# Patient Record
Sex: FEMALE | Race: WHITE | Employment: UNEMPLOYED | URBAN - METROPOLITAN AREA
[De-identification: names, ages, dates, MRNs, and addresses within clinical notes are randomized per-mention and may not be internally consistent; named-entity substitution may affect disease eponyms.]

---

## 2017-01-04 ENCOUNTER — APPOINTMENT (OUTPATIENT)
Dept: GENERAL RADIOLOGY | Age: 47
End: 2017-01-04
Attending: PHYSICIAN ASSISTANT
Payer: SELF-PAY

## 2017-01-04 ENCOUNTER — HOSPITAL ENCOUNTER (EMERGENCY)
Age: 47
Discharge: HOME | End: 2017-01-04
Attending: EMERGENCY MEDICINE
Payer: SELF-PAY

## 2017-01-04 VITALS
SYSTOLIC BLOOD PRESSURE: 140 MMHG | TEMPERATURE: 97.8 F | HEART RATE: 74 BPM | RESPIRATION RATE: 18 BRPM | BODY MASS INDEX: 40.59 KG/M2 | HEIGHT: 61 IN | WEIGHT: 215 LBS | DIASTOLIC BLOOD PRESSURE: 93 MMHG | OXYGEN SATURATION: 100 %

## 2017-01-04 DIAGNOSIS — K27.9 PEPTIC ULCER: Primary | ICD-10-CM

## 2017-01-04 LAB
ALBUMIN SERPL BCP-MCNC: 3.6 G/DL (ref 3.4–5)
ALBUMIN/GLOB SERPL: 1.1 {RATIO} (ref 0.8–1.7)
ALP SERPL-CCNC: 136 U/L (ref 45–117)
ALT SERPL-CCNC: 24 U/L (ref 13–56)
ANION GAP BLD CALC-SCNC: 8 MMOL/L (ref 3–18)
APPEARANCE UR: CLEAR
AST SERPL W P-5'-P-CCNC: 14 U/L (ref 15–37)
BACTERIA URNS QL MICRO: NEGATIVE /HPF
BASOPHILS # BLD AUTO: 0 K/UL (ref 0–0.06)
BASOPHILS # BLD: 0 % (ref 0–2)
BILIRUB SERPL-MCNC: 0.9 MG/DL (ref 0.2–1)
BILIRUB UR QL: NEGATIVE
BUN SERPL-MCNC: 13 MG/DL (ref 7–18)
BUN/CREAT SERPL: 20 (ref 12–20)
CALCIUM SERPL-MCNC: 9 MG/DL (ref 8.5–10.1)
CHLORIDE SERPL-SCNC: 105 MMOL/L (ref 100–108)
CK MB CFR SERPL CALC: 1.3 % (ref 0–4)
CK MB SERPL-MCNC: 1 NG/ML (ref 0.5–3.6)
CK SERPL-CCNC: 80 U/L (ref 26–192)
CO2 SERPL-SCNC: 27 MMOL/L (ref 21–32)
COLOR UR: YELLOW
CREAT SERPL-MCNC: 0.66 MG/DL (ref 0.6–1.3)
DIFFERENTIAL METHOD BLD: ABNORMAL
EOSINOPHIL # BLD: 0.1 K/UL (ref 0–0.4)
EOSINOPHIL NFR BLD: 1 % (ref 0–5)
EPITH CASTS URNS QL MICRO: NORMAL /LPF (ref 0–5)
ERYTHROCYTE [DISTWIDTH] IN BLOOD BY AUTOMATED COUNT: 16 % (ref 11.6–14.5)
GLOBULIN SER CALC-MCNC: 3.3 G/DL (ref 2–4)
GLUCOSE SERPL-MCNC: 114 MG/DL (ref 74–99)
GLUCOSE UR STRIP.AUTO-MCNC: NEGATIVE MG/DL
HCT VFR BLD AUTO: 37.6 % (ref 35–45)
HGB BLD-MCNC: 11.8 G/DL (ref 12–16)
HGB UR QL STRIP: ABNORMAL
KETONES UR QL STRIP.AUTO: NEGATIVE MG/DL
LEUKOCYTE ESTERASE UR QL STRIP.AUTO: NEGATIVE
LIPASE SERPL-CCNC: 149 U/L (ref 73–393)
LYMPHOCYTES # BLD AUTO: 24 % (ref 21–52)
LYMPHOCYTES # BLD: 2 K/UL (ref 0.9–3.6)
MCH RBC QN AUTO: 23.2 PG (ref 24–34)
MCHC RBC AUTO-ENTMCNC: 31.4 G/DL (ref 31–37)
MCV RBC AUTO: 73.9 FL (ref 74–97)
MONOCYTES # BLD: 0.5 K/UL (ref 0.05–1.2)
MONOCYTES NFR BLD AUTO: 6 % (ref 3–10)
NEUTS SEG # BLD: 5.9 K/UL (ref 1.8–8)
NEUTS SEG NFR BLD AUTO: 69 % (ref 40–73)
NITRITE UR QL STRIP.AUTO: NEGATIVE
PH UR STRIP: 5.5 [PH] (ref 5–8)
PLATELET # BLD AUTO: 284 K/UL (ref 135–420)
PMV BLD AUTO: 10.3 FL (ref 9.2–11.8)
POTASSIUM SERPL-SCNC: 4.2 MMOL/L (ref 3.5–5.5)
PROT SERPL-MCNC: 6.9 G/DL (ref 6.4–8.2)
PROT UR STRIP-MCNC: NEGATIVE MG/DL
RBC # BLD AUTO: 5.09 M/UL (ref 4.2–5.3)
RBC #/AREA URNS HPF: NORMAL /HPF (ref 0–5)
SODIUM SERPL-SCNC: 140 MMOL/L (ref 136–145)
SP GR UR REFRACTOMETRY: 1.02 (ref 1–1.03)
TROPONIN I SERPL-MCNC: <0.02 NG/ML (ref 0–0.04)
UROBILINOGEN UR QL STRIP.AUTO: 0.2 EU/DL (ref 0.2–1)
WBC # BLD AUTO: 8.6 K/UL (ref 4.6–13.2)
WBC URNS QL MICRO: NORMAL /HPF (ref 0–4)

## 2017-01-04 PROCEDURE — 83690 ASSAY OF LIPASE: CPT | Performed by: PHYSICIAN ASSISTANT

## 2017-01-04 PROCEDURE — 71020 XR CHEST PA LAT: CPT

## 2017-01-04 PROCEDURE — 82550 ASSAY OF CK (CPK): CPT | Performed by: PHYSICIAN ASSISTANT

## 2017-01-04 PROCEDURE — 80053 COMPREHEN METABOLIC PANEL: CPT | Performed by: EMERGENCY MEDICINE

## 2017-01-04 PROCEDURE — 93005 ELECTROCARDIOGRAM TRACING: CPT

## 2017-01-04 PROCEDURE — 85025 COMPLETE CBC W/AUTO DIFF WBC: CPT | Performed by: EMERGENCY MEDICINE

## 2017-01-04 PROCEDURE — 81001 URINALYSIS AUTO W/SCOPE: CPT | Performed by: EMERGENCY MEDICINE

## 2017-01-04 PROCEDURE — 99283 EMERGENCY DEPT VISIT LOW MDM: CPT

## 2017-01-04 RX ORDER — RANITIDINE 300 MG/1
300 TABLET ORAL DAILY
Qty: 30 TAB | Refills: 0 | Status: SHIPPED | OUTPATIENT
Start: 2017-01-04 | End: 2017-02-03

## 2017-01-04 RX ORDER — OMEPRAZOLE 40 MG/1
40 CAPSULE, DELAYED RELEASE ORAL DAILY
Qty: 30 CAP | Refills: 0 | Status: SHIPPED | OUTPATIENT
Start: 2017-01-04 | End: 2017-02-03

## 2017-01-04 RX ORDER — HYDROCODONE BITARTRATE AND ACETAMINOPHEN 5; 325 MG/1; MG/1
TABLET ORAL
Qty: 12 TAB | Refills: 0 | Status: SHIPPED | OUTPATIENT
Start: 2017-01-04 | End: 2017-02-28

## 2017-01-04 NOTE — DISCHARGE INSTRUCTIONS
Peptic Ulcer Disease: Care Instructions  Your Care Instructions    Peptic ulcers are sores on the inside of the stomach or the small intestine. They are usually caused by an infection with bacteria or from use of nonsteroidal anti-inflammatory drugs (NSAIDs). NSAIDs include aspirin, ibuprofen (Advil), and naproxen (Aleve). Your doctor may have prescribed medicine to reduce stomach acid. You also may need to take antibiotics if your peptic ulcers are caused by an infection. You can help your stomach heal and keep ulcers from coming back by making some changes in your lifestyle. Quit smoking, limit caffeine and alcohol, and reduce stress. Follow-up care is a key part of your treatment and safety. Be sure to make and go to all appointments, and call your doctor if you are having problems. Its also a good idea to know your test results and keep a list of the medicines you take. How can you care for yourself at home? · Take your medicines exactly as prescribed. Call your doctor if you think you are having a problem with your medicine. · Do not take aspirin or other NSAIDs such as ibuprofen (Advil or Motrin) or naproxen (Aleve). Ask your doctor what you can take for pain. · Do not smoke. Smoking can make ulcers worse. If you need help quitting, talk to your doctor about stop-smoking programs and medicines. These can increase your chances of quitting for good. · Drink in moderation or avoid drinking alcohol. · Do not drink beverages that have caffeine if they bother your stomach. These include coffee, tea, and soda. · Eat a balanced diet of small, frequent meals. Make an appointment with a dietitian if you need help planning your meals. · Reduce stress. Avoid people and places that make you feel anxious, if you can. Learn ways to reduce stress, such as biofeedback, guided imagery, and meditation. When should you call for help? Call 911 anytime you think you may need emergency care.  For example, call if:  · You passed out (lost consciousness). · You vomit blood or what looks like coffee grounds. · You pass maroon or very bloody stools. Call your doctor now or seek immediate medical care if:  · You have severe pain in your belly, back, or shoulders. · You have new or worsening belly pain. · You are dizzy or lightheaded, or you feel like you may faint. · Your stools are black and tarlike or have streaks of blood. Watch closely for changes in your health, and be sure to contact your doctor if:  · You have new symptoms such as weight loss, nausea or vomiting. · You do not feel better as expected. Where can you learn more? Go to http://curtis-sherrie.info/. Enter B693 in the search box to learn more about \"Peptic Ulcer Disease: Care Instructions. \"  Current as of: August 9, 2016  Content Version: 11.1  © 4593-5001 "[employer]. Care instructions adapted under license by [employer] (which disclaims liability or warranty for this information). If you have questions about a medical condition or this instruction, always ask your healthcare professional. [place] [address] any warranty or liability for your use of this information.

## 2017-01-04 NOTE — ED PROVIDER NOTES
HPI Comments: Pt is a 41yo female with hx of GERD, anemia, dyspepsia, chronic abdominal pain presenting to ED with abdominal pain x1 day. Pt states pain worsened today while at work. Pt states she became very diaphoretic and boss sent her to ED for eval. Pain is located in upper abdomen and pt has had nausea with 1 episode of vomiting today. Pt states she has GERD and has been diagnosed with an \"ulcer\" in abdomen therefore has chronic abdominal discomfort. Pt states she has had colonoscopy where she was told she had a small ulcer but not clear as to where. Pt states she was started on zantac and pepcid but has not taken either since July. Pt states she has occasional SOB with pain, denies CP, fever, chills, back pain, diarrhea, blood in stool, blood from rectum, dizziness, headaches, urinary symptoms, vaginal discharge/bleeding. Hx of tubal and gall bladder removal. Denies smoking, etoh, drug use    States pain at present is 6/10 but does not want any medication for pain. Patient is a 42 y.o. female presenting with abdominal pain and vomiting. The history is provided by the patient. Abdominal Pain    Associated symptoms include vomiting. Vomiting    Associated symptoms include abdominal pain.         Past Medical History:   Diagnosis Date    Anemia NEC     Arthritis     Dyspepsia and other specified disorders of function of stomach     Endocrine disease      anemic    Kidney stone        Past Surgical History:   Procedure Laterality Date    Hx other surgical       tubal ligation    Pr abdomen surgery proc unlisted  12/24/2012     cholecystectomy    Pr ercp w/sphincterotomy/papillotomy  12/26/2012          Pr ercp remove calculi/debris biliary/pancreas duct  12/26/2012          Hx cholecystectomy  2012    Hx gyn  1992     ceserean    Hx gyn  2001     tubal         Family History:   Problem Relation Age of Onset    Hypertension Mother     Stroke Father     Hypertension Father     Heart Disease Father     Cancer Father      leukemia    Heart Attack       grandfather - 76s    Heart Disease Daughter        Social History     Social History    Marital status: SINGLE     Spouse name: N/A    Number of children: N/A    Years of education: N/A     Occupational History    Not on file. Social History Main Topics    Smoking status: Never Smoker    Smokeless tobacco: Not on file    Alcohol use No    Drug use: No    Sexual activity: Not on file     Other Topics Concern    Not on file     Social History Narrative    ** Merged History Encounter **              ALLERGIES: Codeine and Codeine    Review of Systems   Gastrointestinal: Positive for abdominal pain and vomiting. Vitals:    01/04/17 1343   BP: 142/85   Pulse: 71   Resp: 16   Temp: 98.3 °F (36.8 °C)   SpO2: 100%   Weight: 97.5 kg (215 lb)   Height: 5' 1\" (1.549 m)            Physical Exam   Constitutional: She is oriented to person, place, and time. She appears well-developed and well-nourished. No distress. Appears very comfortable. Eating snack. HENT:   Head: Normocephalic and atraumatic. Eyes: Conjunctivae and EOM are normal.   Neck: Normal range of motion. Neck supple. Cardiovascular: Normal rate, regular rhythm and normal heart sounds. Pulmonary/Chest: Effort normal and breath sounds normal. No respiratory distress. Abdominal: Soft. She exhibits no distension and no mass. There is no rebound and no guarding. Generalized tenderness, no localization    Musculoskeletal: Normal range of motion. Neurological: She is oriented to person, place, and time. No cranial nerve deficit. Coordination normal.   Skin: Skin is warm and dry. Nursing note and vitals reviewed. MDM  Number of Diagnoses or Management Options  Diagnosis management comments: 39yo female with hx of peptic ulcer, kidney stones presenting with abdominal pain and nauea/vomiting x1 day.   Pt appears comfortable at present, afebrile, nontoxic and normal vitals. Will obtain cardiac panel, ekg, cmp, cbc, ua, cxr. Pt does not meet criteria for CT of abdomen at this time. Labs and ua unremarkable,  ua with blood however patient is currently on menstrual cycle. Donnell Beebe PA-C    5:10 PM  Patient is very comfortable in bed, abdomen remains soft. No vomiting, diarrhea while in ED. Pt is afebrile and vitals normal.  Discussed lab and ua results. Pt is ready for d/c home    Impression: peptic ulcer   Pt will be given Rx for zantac and prilosec to start again since she has not taken since July. Pt is instructed to f/u with PCP within 3-5 days and return to ED if she experieinces fever, chills, vomiting, blood in stool, etc.  Donnell Beebe PA-C 5:12 PM    Discussed proper way to take medications. Discussed treatment plan, return precautions, symptomatic relief, and expected time to improvement. All questions answered. Patient is stable for discharge and outpatient management. Donnell Beebe PA-C           Amount and/or Complexity of Data Reviewed  Clinical lab tests: ordered and reviewed  Tests in the radiology section of CPT®: ordered and reviewed    Risk of Complications, Morbidity, and/or Mortality  Presenting problems: moderate  Diagnostic procedures: moderate  Management options: moderate    Patient Progress  Patient progress: improved    ED Course       Procedures    Diagnosis:   1. Peptic ulcer          Disposition: discharge home    Follow-up Information     Follow up With Details [facility] Schedule an appointment as soon as possible for a visit in 1 week  [address] EMERGENCY DEPT  If symptoms worsen, As needed [address]  [phone]          Patient's Medications   Start Taking    No medications on file   Continue Taking    BIOTIN 2,500 MCG CAP    Take  by mouth.     CYANOCOBALAMIN (VITAMIN B-12) 1,000 MCG TABLET    Take 1,000 mcg by mouth daily. ETHINYL ESTRADIOL-ETONOGESTREL (NUVARING) 0.12-0.015 MG/24 HR VAGINAL RING    by Intravaginally route. METHOCARBAMOL (ROBAXIN) 500 MG TABLET    Take 1 Tab by mouth three (3) times daily. PRN spasm, pain    TAMSULOSIN (FLOMAX) 0.4 MG CAPSULE    Take 1 Cap by mouth daily. TAMSULOSIN (FLOMAX) 0.4 MG CAPSULE    Take 1 Cap by mouth daily (after dinner). TAMSULOSIN (FLOMAX) 0.4 MG CAPSULE    TAKE ONE CAPSULE BY MOUTH DAILY AFTER DINNER   These Medications have changed    Modified Medication Previous Medication    HYDROCODONE-ACETAMINOPHEN (NORCO) 5-325 MG PER TABLET HYDROcodone-acetaminophen (NORCO) 5-325 mg per tablet       Take 1-2 tabs PO every 4-6 hours prn pain    Take 1-2 tabs PO every 4-6 hours prn pain    OMEPRAZOLE (PRILOSEC) 40 MG CAPSULE omeprazole (PRILOSEC) 40 mg capsule       Take 1 Cap by mouth daily for 30 days. Take 40 mg by mouth daily. RANITIDINE (ZANTAC) 300 MG TABLET ranitidine (ZANTAC) 300 mg tablet       Take 1 Tab by mouth daily for 30 days. Take 300 mg by mouth daily.    Stop Taking    No medications on file

## 2017-01-04 NOTE — ED NOTES
I have reviewed discharge instructions with the patient. The patient verbalized understanding. Patient armband removed and shredded. VSS.  Patient verbalized understanding of how to properly take prescribed medications

## 2017-01-04 NOTE — Clinical Note
Follow up with [facility] within [facility] days, return to ED with symptoms discussed or with new/concerning symptoms. Take prilosec and zantac as directed.

## 2017-01-05 LAB
ATRIAL RATE: 75 BPM
CALCULATED P AXIS, ECG09: 30 DEGREES
CALCULATED R AXIS, ECG10: 9 DEGREES
CALCULATED T AXIS, ECG11: 23 DEGREES
DIAGNOSIS, 93000: NORMAL
P-R INTERVAL, ECG05: 180 MS
Q-T INTERVAL, ECG07: 382 MS
QRS DURATION, ECG06: 74 MS
QTC CALCULATION (BEZET), ECG08: 426 MS
VENTRICULAR RATE, ECG03: 75 BPM

## 2017-02-20 ENCOUNTER — APPOINTMENT (OUTPATIENT)
Dept: GENERAL RADIOLOGY | Age: 47
End: 2017-02-20
Attending: EMERGENCY MEDICINE
Payer: SELF-PAY

## 2017-02-20 ENCOUNTER — HOSPITAL ENCOUNTER (EMERGENCY)
Age: 47
Discharge: HOME | End: 2017-02-20
Attending: EMERGENCY MEDICINE | Admitting: EMERGENCY MEDICINE
Payer: SELF-PAY

## 2017-02-20 VITALS
DIASTOLIC BLOOD PRESSURE: 98 MMHG | OXYGEN SATURATION: 100 % | BODY MASS INDEX: 44.97 KG/M2 | WEIGHT: 238 LBS | SYSTOLIC BLOOD PRESSURE: 149 MMHG | RESPIRATION RATE: 18 BRPM | HEART RATE: 87 BPM | TEMPERATURE: 98.6 F

## 2017-02-20 DIAGNOSIS — R05.9 COUGH: Primary | ICD-10-CM

## 2017-02-20 DIAGNOSIS — J06.9 ACUTE URI: ICD-10-CM

## 2017-02-20 DIAGNOSIS — I15.9 SECONDARY HYPERTENSION: ICD-10-CM

## 2017-02-20 PROCEDURE — 71020 XR CHEST PA LAT: CPT

## 2017-02-20 PROCEDURE — 99282 EMERGENCY DEPT VISIT SF MDM: CPT

## 2017-02-20 RX ORDER — OMEPRAZOLE 40 MG/1
40 CAPSULE, DELAYED RELEASE ORAL DAILY
COMMUNITY

## 2017-02-20 RX ORDER — BENZONATATE 100 MG/1
100 CAPSULE ORAL
Qty: 30 CAP | Refills: 0 | Status: SHIPPED | OUTPATIENT
Start: 2017-02-20 | End: 2017-02-27

## 2017-02-20 RX ORDER — RANITIDINE HCL 75 MG
75 TABLET ORAL 2 TIMES DAILY
COMMUNITY
End: 2020-01-02

## 2017-02-20 RX ORDER — AZITHROMYCIN 250 MG/1
TABLET, FILM COATED ORAL
Qty: 6 TAB | Refills: 0 | Status: SHIPPED | OUTPATIENT
Start: 2017-02-20 | End: 2017-02-25

## 2017-02-21 NOTE — DISCHARGE INSTRUCTIONS
One or more blood pressure readings were noted elevated above normal while you were here in the emergency department. This finding was included in your diagnosis. You are encouraged to follow up with your family doctor or the consultant provided for further evaluation and treatment. Cough: Care Instructions  Your Care Instructions  A cough is your body's response to something that bothers your throat or airways. Many things can cause a cough. You might cough because of a cold or the flu, bronchitis, or asthma. Smoking, postnasal drip, allergies, and stomach acid that backs up into your throat also can cause coughs. A cough is a symptom, not a disease. Most coughs stop when the cause, such as a cold, goes away. You can take a few steps at home to cough less and feel better. Follow-up care is a key part of your treatment and safety. Be sure to make and go to all appointments, and call your doctor if you are having problems. It's also a good idea to know your test results and keep a list of the medicines you take. How can you care for yourself at home? · Drink lots of water and other fluids. This helps thin the mucus and soothes a dry or sore throat. Honey or lemon juice in hot water or tea may ease a dry cough. · Take cough medicine as directed by your doctor. · Prop up your head on pillows to help you breathe and ease a dry cough. · Try cough drops to soothe a dry or sore throat. Cough drops don't stop a cough. Medicine-flavored cough drops are no better than candy-flavored drops or hard candy. · Do not smoke. Avoid secondhand smoke. If you need help quitting, talk to your doctor about stop-smoking programs and medicines. These can increase your chances of quitting for good. When should you call for help? Call 911 anytime you think you may need emergency care. For example, call if:  · You have severe trouble breathing.   Call your doctor now or seek immediate medical care if:  · You cough up blood.  · You have new or worse trouble breathing. · You have a new or higher fever. · You have a new rash. Watch closely for changes in your health, and be sure to contact your doctor if:  · You cough more deeply or more often, especially if you notice more mucus or a change in the color of your mucus. · You have new symptoms, such as a sore throat, an earache, or sinus pain. · You do not get better as expected. Where can you learn more? Go to http://curtis-sherrie.info/. Enter D279 in the search box to learn more about \"Cough: Care Instructions. \"  Current as of: May 27, 2016  Content Version: 11.1  © 3297-4681 [employer]. Care instructions adapted under license by [employer] (which disclaims liability or warranty for this information). If you have questions about a medical condition or this instruction, always ask your healthcare professional. [address] any warranty or liability for your use of this information.

## 2017-02-21 NOTE — ED PROVIDER NOTES
HPI Comments: 8:01 PM Sarah Calero is a 42 y.o. female with a history of anemia and arthritis, who presents to the ED for evaluation of coughing, which began \"over 2 weeks ago. \" She states that she has been producing \"clear\" sputum. SHe states that she has no other medical problems, but admits to taking prilosec and zantac daily. Patient denies any tobacco, alcohol, or any illicit drug use. She notes a surgical hx of cholecystectomy, \"foot surgery,\" and tubal ligation. She states that she is allergic to codeine. She denies any fevers or any other sx. There are no other concerns at this time. Patient is a 42 y.o. female presenting with cough. The history is provided by the patient. Cough          Past Medical History:   Diagnosis Date    Anemia NEC     Arthritis     Dyspepsia and other specified disorders of function of stomach     Endocrine disease      anemic    Kidney stone        Past Surgical History:   Procedure Laterality Date    Hx other surgical       tubal ligation    Pr abdomen surgery proc unlisted  12/24/2012     cholecystectomy    Pr ercp w/sphincterotomy/papillotomy  12/26/2012          Pr ercp remove calculi/debris biliary/pancreas duct  12/26/2012          Hx cholecystectomy  2012    Hx gyn  1992     ceserean    Hx gyn  2001     tubal         Family History:   Problem Relation Age of Onset    Hypertension Mother     Stroke Father     Hypertension Father     Heart Disease Father     Cancer Father      leukemia    Heart Attack Other      grandfather - 76s    Heart Disease Daughter        Social History     Social History    Marital status: SINGLE     Spouse name: N/A    Number of children: N/A    Years of education: N/A     Occupational History    Not on file.      Social History Main Topics    Smoking status: Never Smoker    Smokeless tobacco: Not on file    Alcohol use No    Drug use: No    Sexual activity: Not on file     Other Topics Concern    Not on file Social History Narrative    ** Merged History Encounter **              ALLERGIES: Codeine and Codeine    Review of Systems   Constitutional: Negative. Negative for fever. HENT: Negative. Eyes: Negative. Respiratory: Positive for cough. Cardiovascular: Negative. Gastrointestinal: Negative. Endocrine: Negative. Genitourinary: Negative. Musculoskeletal: Negative. Skin: Negative. Allergic/Immunologic: Negative. Neurological: Negative. Hematological: Negative. Psychiatric/Behavioral: Negative. All other systems reviewed and are negative. Vitals:    02/20/17 2000   BP: (!) 152/97   Pulse: 92   Resp: 16   Temp: 98.6 °F (37 °C)   SpO2: 100%   Weight: 108 kg (238 lb)   I have counseled the patient regarding their blood pressure, which was elevated today. I have instructed the patient to follow up with a primary care provider regarding this issue, and discussed the risks of prolonged, untreated high blood pressure, including heart attack, stroke, disability, and death. The patient has verbalized understanding. 100% on RA, indicating adequate oxygenation. Physical Exam   Constitutional: She is oriented to person, place, and time. She appears well-developed and well-nourished. HENT:   Head: Normocephalic and atraumatic. Eyes: Conjunctivae and EOM are normal. Pupils are equal, round, and reactive to light. Neck: Normal range of motion. Neck supple. Cardiovascular: Normal rate, regular rhythm, normal heart sounds and intact distal pulses. Pulmonary/Chest: Effort normal and breath sounds normal.   Abdominal: Soft. Bowel sounds are normal.   Musculoskeletal: Normal range of motion. Neurological: She is alert and oriented to person, place, and time. Skin: Skin is warm and dry. Psychiatric: She has a normal mood and affect. Her behavior is normal. Judgment and thought content normal.   Nursing note and vitals reviewed.        MDM  ED Course Procedures    X-Ray, CT or other radiology findings or impressions:  XR CHEST PA LAT    (Results Pending)   8:53 PM There is no acute cardiopulmonary disease noted on the patient's chest x-ray, as interpreted by myself. Orders  Orders Placed This Encounter    XR CHEST PA LAT     Standing Status:   Standing     Number of Occurrences:   1     Order Specific Question:   Transport     Answer:   Jessee [5]     Order Specific Question:   Reason for Exam     Answer:   SOB    raNITIdine (ZANTAC 75) 75 mg tablet     Sig: Take 75 mg by mouth two (2) times a day.  omeprazole (PRILOSEC) 40 mg capsule     Sig: Take 40 mg by mouth daily.  azithromycin (ZITHROMAX Z-JACK) 250 mg tablet     Sig: Take package as directed     Dispense:  6 Tab     Refill:  0    benzonatate (TESSALON PERLES) 100 mg capsule     Sig: Take 1 Cap by mouth three (3) times daily as needed for Cough for up to 7 days. Dispense:  30 Cap     Refill:  0       Re-evaluation:  8:53 PM I have assessed the patient, who will be discharged in stable condition. I've given the patient precautions to return to the emergency room if there are any new or worsening conditions. I've also instructed the patient to follow up regarding their visit today. Patient has verbalized understanding and agreement with treatment plan, plan to discharge, and follow-up. All questions were answered at this time. Disposition:  Diagnosis:   1. Cough    2. Acute URI    3.  Secondary hypertension        Disposition: Discharge    Follow-up Information     Follow up With Details Comments Contact Info    Rodolfo Leavitt DO Schedule an appointment as soon as possible for a visit in 2 days  [address] [place] [id number]  [phone]      [facility] EMERGENCY DEPT  If symptoms worsen or any new concerns [address]  [phone]           Patient's Medications   Start Taking    AZITHROMYCIN [facility] Z-JACK) 250 MG TABLET    Take package as directed    BENZONATATE (TESSALON PERLES) 100 MG CAPSULE    Take 1 Cap by mouth three (3) times daily as needed for Cough for up to 7 days. Continue Taking    BIOTIN 2,500 MCG CAP    Take  by mouth. CYANOCOBALAMIN (VITAMIN B-12) 1,000 MCG TABLET    Take 1,000 mcg by mouth daily. ETHINYL ESTRADIOL-ETONOGESTREL (NUVARING) 0.12-0.015 MG/24 HR VAGINAL RING    by Intravaginally route. HYDROCODONE-ACETAMINOPHEN (NORCO) 5-325 MG PER TABLET    Take 1-2 tabs PO every 4-6 hours prn pain    METHOCARBAMOL (ROBAXIN) 500 MG TABLET    Take 1 Tab by mouth three (3) times daily. PRN spasm, pain    OMEPRAZOLE (PRILOSEC) 40 MG CAPSULE    Take 40 mg by mouth daily. RANITIDINE (ZANTAC 75) 75 MG TABLET    Take 75 mg by mouth two (2) times a day. TAMSULOSIN (FLOMAX) 0.4 MG CAPSULE    Take 1 Cap by mouth daily. TAMSULOSIN (FLOMAX) 0.4 MG CAPSULE    Take 1 Cap by mouth daily (after dinner). TAMSULOSIN (FLOMAX) 0.4 MG CAPSULE    TAKE ONE CAPSULE BY MOUTH DAILY AFTER DINNER   These Medications have changed    No medications on file   Stop Taking    No medications on file         Scribe Attestation:   Maryjo English acting as a scribe for and in the presence of Dr. Crissy Hanna MD     Signed by: Liudmila Victoria, February 20, 2017 at 8:53 PM     Provider Attestation:   I personally performed the services described in the documentation, reviewed the documentation, as recorded by the scribe in my presence, and it accurately and completely records my words and actions.      Reviewed and signed by:  Dr. Crissy Hanna MD

## 2017-02-28 ENCOUNTER — HOSPITAL ENCOUNTER (EMERGENCY)
Age: 47
Discharge: HOME | End: 2017-02-28
Attending: EMERGENCY MEDICINE
Payer: SELF-PAY

## 2017-02-28 VITALS
BODY MASS INDEX: 44.93 KG/M2 | SYSTOLIC BLOOD PRESSURE: 159 MMHG | HEART RATE: 100 BPM | RESPIRATION RATE: 17 BRPM | OXYGEN SATURATION: 100 % | DIASTOLIC BLOOD PRESSURE: 98 MMHG | HEIGHT: 61 IN | TEMPERATURE: 99.7 F | WEIGHT: 238 LBS

## 2017-02-28 DIAGNOSIS — J20.9 ACUTE BRONCHITIS, UNSPECIFIED ORGANISM: Primary | ICD-10-CM

## 2017-02-28 PROCEDURE — 74011250637 HC RX REV CODE- 250/637: Performed by: EMERGENCY MEDICINE

## 2017-02-28 PROCEDURE — 99283 EMERGENCY DEPT VISIT LOW MDM: CPT

## 2017-02-28 PROCEDURE — 74011000250 HC RX REV CODE- 250: Performed by: EMERGENCY MEDICINE

## 2017-02-28 PROCEDURE — 94640 AIRWAY INHALATION TREATMENT: CPT

## 2017-02-28 PROCEDURE — 77030013140 HC MSK NEB VYRM -A

## 2017-02-28 RX ORDER — ALBUTEROL SULFATE 90 UG/1
2 AEROSOL, METERED RESPIRATORY (INHALATION)
Qty: 1 INHALER | Refills: 0 | Status: SHIPPED | OUTPATIENT
Start: 2017-02-28 | End: 2018-11-03

## 2017-02-28 RX ORDER — CODEINE PHOSPHATE AND GUAIFENESIN 10; 100 MG/5ML; MG/5ML
10 SOLUTION ORAL
Qty: 118 ML | Refills: 0 | Status: SHIPPED | OUTPATIENT
Start: 2017-02-28 | End: 2017-03-07

## 2017-02-28 RX ORDER — IPRATROPIUM BROMIDE AND ALBUTEROL SULFATE 2.5; .5 MG/3ML; MG/3ML
3 SOLUTION RESPIRATORY (INHALATION)
Status: COMPLETED | OUTPATIENT
Start: 2017-02-28 | End: 2017-02-28

## 2017-02-28 RX ORDER — CODEINE PHOSPHATE AND GUAIFENESIN 10; 100 MG/5ML; MG/5ML
10 SOLUTION ORAL
Status: COMPLETED | OUTPATIENT
Start: 2017-02-28 | End: 2017-02-28

## 2017-02-28 RX ADMIN — GUAIFENESIN AND CODEINE PHOSPHATE 10 ML: 10; 100 LIQUID ORAL at 20:33

## 2017-02-28 RX ADMIN — IPRATROPIUM BROMIDE AND ALBUTEROL SULFATE 3 ML: .5; 3 SOLUTION RESPIRATORY (INHALATION) at 20:33

## 2017-02-28 NOTE — LETTER
NOTIFICATION RETURN TO WORK / SCHOOL 
 
2/28/2017 9:07 PM 
 
Ms. Yady Lopez [id number] [place] [id number] To Whom It May Concern: 
 
Yady Lopez is currently under the care of [facility] EMERGENCY DEPT. She will return to work/school on: 3/2/2017 If there are questions or concerns please have the patient contact our office. Sincerely, Fabiola Park RN

## 2017-03-01 NOTE — ED PROVIDER NOTES
HPI Comments: 8:14 PM Cierra Hill is a 42 y.o. female with a history of Endocrine disease, Anemia, Arthritis, and Kidney stones who presents to the emergency department c/o productive cough with grey sputum onset last week. The pt reports associated symptoms of headache, generalized myalgia, and wheezing at times. Pt rates the headache pain 7/10 in severity. The patient explains she visited last week for the same symptoms and prescribed Z-pack and Tessalon. Pt notes finishing her antibiotic treatment yesterday. Pt states the symptoms have not subsided. Pt denies fever. Pt states she is not allergic to Codeine, she has been taking it in the past with no problems. No other concerns at this time. PCP: PCP at [facility]       The history is provided by the patient. Past Medical History:   Diagnosis Date    Anemia NEC     Arthritis     Dyspepsia and other specified disorders of function of stomach     Endocrine disease     anemic    Kidney stone        Past Surgical History:   Procedure Laterality Date    ABDOMEN SURGERY PROC UNLISTED  12/24/2012    cholecystectomy    HX CHOLECYSTECTOMY  2012    HX GYN  1992    ceserean    HX GYN  2001    tubal    HX OTHER SURGICAL      tubal ligation    [facility] REMOVE CALCULI/DEBRIS BILIARY/PANCREAS DUCT  12/26/2012         [facility] ER[facility] W/SPHINCTEROTOMY/PAPILLOTOMY  12/26/2012              Family History:   Problem Relation Age of Onset    Hypertension Mother     Stroke Father     Hypertension Father     Heart Disease Father     Cancer Father      leukemia    Heart Attack Other      grandfather - 76s    Heart Disease Daughter        Social History     Social History    Marital status: SINGLE     Spouse name: N/A    Number of children: N/A    Years of education: N/A     Occupational History    Not on file.      Social History Main Topics    Smoking status: Never Smoker    Smokeless tobacco: Not on file    Alcohol use No    Drug use: No    Sexual activity: Not on file     Other Topics Concern    Not on file     Social History Narrative    ** Merged History Encounter **              ALLERGIES: Review of patient's allergies indicates no known allergies. Review of Systems   Constitutional: Negative for fever. HENT: Negative for sore throat. Eyes: Negative for redness. Respiratory: Positive for cough and wheezing. Negative for shortness of breath. Gastrointestinal: Negative for abdominal pain and nausea. Genitourinary: Negative for dysuria. Musculoskeletal: Positive for myalgias (generalized). Negative for neck stiffness. Skin: Negative for pallor. Neurological: Positive for headaches. Hematological: Does not bruise/bleed easily. All other systems reviewed and are negative. Vitals:    02/28/17 2014   BP: (!) 159/98   Pulse: 100   Resp: 17   Temp: 99.7 °F (37.6 °C)   SpO2: 100%   Weight: 108 kg (238 lb)   Height: 5' 1\" (1.549 m)            Physical Exam   Constitutional: She is oriented to person, place, and time. She appears well-developed and well-nourished. No distress. Afebrile. Active coughing. Speaking g in full sentences. Nontoxic appearing. HENT:   Head: Normocephalic and atraumatic. Mouth/Throat: Oropharynx is clear and moist.   Eyes: Conjunctivae are normal. Pupils are equal, round, and reactive to light. No scleral icterus. Neck: Normal range of motion. Neck supple. Cardiovascular: Normal rate, regular rhythm, normal heart sounds and intact distal pulses. Capillary refill < 3 seconds   Pulmonary/Chest: Effort normal and breath sounds normal. No respiratory distress. She has no wheezes. She has no rales. Abdominal: Soft. Bowel sounds are normal. She exhibits no distension. There is no tenderness. Musculoskeletal: Normal range of motion. She exhibits no edema. Lymphadenopathy:     She has no cervical adenopathy. Neurological: She is alert and oriented to person, place, and time.  No cranial nerve deficit. Skin: Skin is warm and dry. She is not diaphoretic. Nursing note and vitals reviewed. MDM  Number of Diagnoses or Management Options  Acute bronchitis, unspecified organism:   Diagnosis management comments: ddx pna, bronchitis, uri    Neb, robitussin ac    Reassessed and pt feeling better. I have reassessed the patient. I have discussed the workup, results and plan with the patient and patient is in agreement. Patient is feeling better. Patient will be prescribed albuterol, robitussin ac. Patient was discharge in stable condition. Patient was given outpatient follow up. Patient is to return to emergency department if any new or worsening condition. Amount and/or Complexity of Data Reviewed  Tests in the radiology section of CPT®: ordered and reviewed  Tests in the medicine section of CPT®: ordered and reviewed      ED Course       Procedures    Vitals:  Patient Vitals for the past 12 hrs:   Temp Pulse Resp BP SpO2   02/28/17 2014 99.7 °F (37.6 °C) 100 17 (!) 159/98 100 %    100% on RA, indicating adequate oxygenation. Medications ordered:   Medications   albuterol-ipratropium (DUO-NEB) 2.5 MG-0.5 MG/3 ML (3 mL Nebulization Given 2/28/17 2033)   guaiFENesin-codeine (ROBITUSSIN AC) 100-10 mg/5 mL solution 10 mL (10 mL Oral Given 2/28/17 2033)         Lab findings:  No results found for this or any previous visit (from the past 12 hour(s)). X-Ray, CT or other radiology findings or impressions:  No orders to display        Progress notes, Consult notes or additional Procedure notes:       Reevaluation of patient:     I have reassessed the patient. Patient was discharged in stable condition. Patient is to return to emergency department if any new or worsening condition. Disposition:  Diagnosis:   1.  Acute bronchitis, unspecified organism        Disposition: Discharge     Follow-up Information     None            Patient's Medications   Start Taking    ALBUTEROL (PROVENTIL HFA, VENTOLIN HFA, PROAIR HFA) 90 MCG/ACTUATION INHALER    Take 2 Puffs by inhalation every four (4) hours as needed for Wheezing or Shortness of Breath. Indications: BRONCHOSPASM PREVENTION    GUAIFENESIN-CODEINE (ROBITUSSIN AC) 100-10 MG/5 ML SOLUTION    Take 10 mL by mouth three (3) times daily as needed for Cough or Congestion for up to 7 days. Max Daily Amount: 30 mL. Indications: COUGH   Continue Taking    BIOTIN 2,500 MCG CAP    Take  by mouth. OMEPRAZOLE (PRILOSEC) 40 MG CAPSULE    Take 40 mg by mouth daily. RANITIDINE (ZANTAC 75) 75 MG TABLET    Take 75 mg by mouth two (2) times a day. These Medications have changed    No medications on file   Stop Taking    CYANOCOBALAMIN (VITAMIN B-12) 1,000 MCG TABLET    Take 1,000 mcg by mouth daily. ETHINYL ESTRADIOL-ETONOGESTREL (NUVARING) 0.12-0.015 MG/24 HR VAGINAL RING    by Intravaginally route. HYDROCODONE-ACETAMINOPHEN (NORCO) 5-325 MG PER TABLET    Take 1-2 tabs PO every 4-6 hours prn pain    METHOCARBAMOL (ROBAXIN) 500 MG TABLET    Take 1 Tab by mouth three (3) times daily. PRN spasm, pain    TAMSULOSIN (FLOMAX) 0.4 MG CAPSULE    Take 1 Cap by mouth daily. TAMSULOSIN (FLOMAX) 0.4 MG CAPSULE    Take 1 Cap by mouth daily (after dinner). TAMSULOSIN (FLOMAX) 0.4 MG CAPSULE    TAKE ONE CAPSULE BY MOUTH DAILY AFTER DINNER     Scribe Attestation  Janusz Arvizu scribing for and in presence of Sammi Richmond DO (02/28/17)      Physician Attestation  I personally preformed the services described in this documentation, reviewed and edited the documentation which was dictated to the scribe in my presence, and it accurately records my words and actions.      Sammi Richmond DO (02/28/17)      Signed by: Liudmila Dsouza, 02/28/17, 8:13 PM

## 2017-03-01 NOTE — ED NOTES
I have reviewed discharge instructions with the patient. The patient verbalized understanding. Patient armband removed and shredded, scripts x 2 given, ride at bedside,  pt informed not to drive after medication given in ER.

## 2017-03-01 NOTE — ED TRIAGE NOTES
C/o productive cough with green sputum. Was seen here a week ago and was prescribed azithromycin and tessalon.

## 2017-03-01 NOTE — DISCHARGE INSTRUCTIONS
Bronchitis: Care Instructions  Your Care Instructions    Bronchitis is inflammation of the bronchial tubes, which carry air to the lungs. The tubes swell and produce mucus, or phlegm. The mucus and inflamed bronchial tubes make you cough. You may have trouble breathing. Most cases of bronchitis are caused by viruses like those that cause colds. Antibiotics usually do not help and they may be harmful. Bronchitis usually develops rapidly and lasts about 2 to 3 weeks in otherwise healthy people. Follow-up care is a key part of your treatment and safety. Be sure to make and go to all appointments, and call your doctor if you are having problems. It's also a good idea to know your test results and keep a list of the medicines you take. How can you care for yourself at home? · Take all medicines exactly as prescribed. Call your doctor if you think you are having a problem with your medicine. · Get some extra rest.  · Take an over-the-counter pain medicine, such as acetaminophen (Tylenol), ibuprofen (Advil, Motrin), or naproxen (Aleve) to reduce fever and relieve body aches. Read and follow all instructions on the label. · Do not take two or more pain medicines at the same time unless the doctor told you to. Many pain medicines have acetaminophen, which is Tylenol. Too much acetaminophen (Tylenol) can be harmful. · Take an over-the-counter cough medicine that contains dextromethorphan to help quiet a dry, hacking cough so that you can sleep. Avoid cough medicines that have more than one active ingredient. Read and follow all instructions on the label. · Breathe moist air from a humidifier, hot shower, or sink filled with hot water. The heat and moisture will thin mucus so you can cough it out. · Do not smoke. Smoking can make bronchitis worse. If you need help quitting, talk to your doctor about stop-smoking programs and medicines. These can increase your chances of quitting for good.   When should you call for help? Call 911 anytime you think you may need emergency care. For example, call if:  · You have severe trouble breathing. Call your doctor now or seek immediate medical care if:  · You have new or worse trouble breathing. · You cough up dark brown or bloody mucus (sputum). · You have a new or higher fever. · You have a new rash. Watch closely for changes in your health, and be sure to contact your doctor if:  · You cough more deeply or more often, especially if you notice more mucus or a change in the color of your mucus. · You are not getting better as expected. Where can you learn more? Go to http://curtis-sherrie.info/. Enter H333 in the search box to learn more about \"Bronchitis: Care Instructions. \"  Current as of: May 23, 2016  Content Version: 11.1  © 1138-0505 [employer]. Care instructions adapted under license by [employer] (which disclaims liability or warranty for this information). If you have questions about a medical condition or this instruction, always ask your healthcare professional. [address] any warranty or liability for your use of this information.

## 2017-08-22 ENCOUNTER — APPOINTMENT (OUTPATIENT)
Dept: CT IMAGING | Age: 47
End: 2017-08-22
Attending: EMERGENCY MEDICINE
Payer: SELF-PAY

## 2017-08-22 ENCOUNTER — HOSPITAL ENCOUNTER (EMERGENCY)
Age: 47
Discharge: HOME | End: 2017-08-22
Attending: EMERGENCY MEDICINE | Admitting: EMERGENCY MEDICINE
Payer: SELF-PAY

## 2017-08-22 ENCOUNTER — APPOINTMENT (OUTPATIENT)
Dept: GENERAL RADIOLOGY | Age: 47
End: 2017-08-22
Attending: EMERGENCY MEDICINE
Payer: SELF-PAY

## 2017-08-22 VITALS
RESPIRATION RATE: 16 BRPM | TEMPERATURE: 98 F | WEIGHT: 249 LBS | DIASTOLIC BLOOD PRESSURE: 70 MMHG | HEART RATE: 86 BPM | BODY MASS INDEX: 45.82 KG/M2 | HEIGHT: 62 IN | SYSTOLIC BLOOD PRESSURE: 130 MMHG | OXYGEN SATURATION: 99 %

## 2017-08-22 DIAGNOSIS — R03.0 ELEVATED BLOOD PRESSURE READING: ICD-10-CM

## 2017-08-22 DIAGNOSIS — G51.0 BELL'S PALSY: Primary | ICD-10-CM

## 2017-08-22 LAB
ALBUMIN SERPL-MCNC: 3.6 G/DL (ref 3.4–5)
ALBUMIN/GLOB SERPL: 0.9 {RATIO} (ref 0.8–1.7)
ALP SERPL-CCNC: 138 U/L (ref 45–117)
ALT SERPL-CCNC: 24 U/L (ref 13–56)
AMPHET UR QL SCN: NEGATIVE
ANION GAP SERPL CALC-SCNC: 6 MMOL/L (ref 3–18)
APPEARANCE UR: CLEAR
ASPIRIN TEST, ASPIRN: 664 ARU
AST SERPL-CCNC: 14 U/L (ref 15–37)
BARBITURATES UR QL SCN: NEGATIVE
BENZODIAZ UR QL: NEGATIVE
BILIRUB SERPL-MCNC: 0.6 MG/DL (ref 0.2–1)
BILIRUB UR QL: NEGATIVE
BUN SERPL-MCNC: 11 MG/DL (ref 7–18)
BUN/CREAT SERPL: 17 (ref 12–20)
CALCIUM SERPL-MCNC: 8.9 MG/DL (ref 8.5–10.1)
CANNABINOIDS UR QL SCN: NEGATIVE
CHLORIDE SERPL-SCNC: 103 MMOL/L (ref 100–108)
CK MB CFR SERPL CALC: NORMAL % (ref 0–4)
CK MB SERPL-MCNC: <1 NG/ML (ref 5–25)
CK SERPL-CCNC: 70 U/L (ref 26–192)
CO2 SERPL-SCNC: 30 MMOL/L (ref 21–32)
COCAINE UR QL SCN: NEGATIVE
COLOR UR: YELLOW
CREAT SERPL-MCNC: 0.63 MG/DL (ref 0.6–1.3)
ERYTHROCYTE [DISTWIDTH] IN BLOOD BY AUTOMATED COUNT: 16.4 % (ref 11.6–14.5)
FIBRINOGEN PPP-MCNC: 474 MG/DL (ref 210–451)
GLOBULIN SER CALC-MCNC: 4.1 G/DL (ref 2–4)
GLUCOSE BLD STRIP.AUTO-MCNC: 93 MG/DL (ref 70–110)
GLUCOSE SERPL-MCNC: 101 MG/DL (ref 74–99)
GLUCOSE UR STRIP.AUTO-MCNC: NEGATIVE MG/DL
HCT VFR BLD AUTO: 37.4 % (ref 35–45)
HDSCOM,HDSCOM: NORMAL
HGB BLD-MCNC: 11.6 G/DL (ref 12–16)
HGB UR QL STRIP: NEGATIVE
INR PPP: 1 (ref 0.8–1.2)
KETONES UR QL STRIP.AUTO: NEGATIVE MG/DL
LEUKOCYTE ESTERASE UR QL STRIP.AUTO: NEGATIVE
MCH RBC QN AUTO: 22.6 PG (ref 24–34)
MCHC RBC AUTO-ENTMCNC: 31 G/DL (ref 31–37)
MCV RBC AUTO: 72.8 FL (ref 74–97)
METHADONE UR QL: NEGATIVE
NITRITE UR QL STRIP.AUTO: NEGATIVE
OPIATES UR QL: NEGATIVE
P2Y12 PLT RESPONSE,PPPR: 272 PRU (ref 194–418)
PCP UR QL: NEGATIVE
PH UR STRIP: 7 [PH] (ref 5–8)
PLATELET # BLD AUTO: 358 K/UL (ref 135–420)
PMV BLD AUTO: 9.5 FL (ref 9.2–11.8)
POTASSIUM SERPL-SCNC: 4 MMOL/L (ref 3.5–5.5)
PROT SERPL-MCNC: 7.7 G/DL (ref 6.4–8.2)
PROT UR STRIP-MCNC: NEGATIVE MG/DL
PROTHROMBIN TIME: 12.5 SEC (ref 11.5–15.2)
RBC # BLD AUTO: 5.14 M/UL (ref 4.2–5.3)
SODIUM SERPL-SCNC: 139 MMOL/L (ref 136–145)
SP GR UR REFRACTOMETRY: <1.005 (ref 1–1.03)
THROMBIN TIME: 15.1 SECS (ref 13.8–18.2)
TROPONIN I SERPL-MCNC: <0.02 NG/ML (ref 0–0.04)
UROBILINOGEN UR QL STRIP.AUTO: 0.2 EU/DL (ref 0.2–1)
WBC # BLD AUTO: 9.6 K/UL (ref 4.6–13.2)

## 2017-08-22 PROCEDURE — 82550 ASSAY OF CK (CPK): CPT | Performed by: EMERGENCY MEDICINE

## 2017-08-22 PROCEDURE — 80307 DRUG TEST PRSMV CHEM ANLYZR: CPT | Performed by: EMERGENCY MEDICINE

## 2017-08-22 PROCEDURE — 85384 FIBRINOGEN ACTIVITY: CPT | Performed by: EMERGENCY MEDICINE

## 2017-08-22 PROCEDURE — 82962 GLUCOSE BLOOD TEST: CPT

## 2017-08-22 PROCEDURE — 99285 EMERGENCY DEPT VISIT HI MDM: CPT

## 2017-08-22 PROCEDURE — 80053 COMPREHEN METABOLIC PANEL: CPT | Performed by: EMERGENCY MEDICINE

## 2017-08-22 PROCEDURE — 93005 ELECTROCARDIOGRAM TRACING: CPT

## 2017-08-22 PROCEDURE — 86618 LYME DISEASE ANTIBODY: CPT | Performed by: EMERGENCY MEDICINE

## 2017-08-22 PROCEDURE — 85670 THROMBIN TIME PLASMA: CPT | Performed by: EMERGENCY MEDICINE

## 2017-08-22 PROCEDURE — 85576 BLOOD PLATELET AGGREGATION: CPT | Performed by: EMERGENCY MEDICINE

## 2017-08-22 PROCEDURE — 86900 BLOOD TYPING SEROLOGIC ABO: CPT | Performed by: EMERGENCY MEDICINE

## 2017-08-22 PROCEDURE — 85610 PROTHROMBIN TIME: CPT | Performed by: EMERGENCY MEDICINE

## 2017-08-22 PROCEDURE — 85027 COMPLETE CBC AUTOMATED: CPT | Performed by: EMERGENCY MEDICINE

## 2017-08-22 PROCEDURE — 71010 XR CHEST PORT: CPT

## 2017-08-22 PROCEDURE — 81003 URINALYSIS AUTO W/O SCOPE: CPT | Performed by: EMERGENCY MEDICINE

## 2017-08-22 PROCEDURE — 70450 CT HEAD/BRAIN W/O DYE: CPT

## 2017-08-22 RX ORDER — PREDNISONE 20 MG/1
60 TABLET ORAL DAILY
Qty: 15 TAB | Refills: 0 | Status: SHIPPED | OUTPATIENT
Start: 2017-08-22 | End: 2017-08-27

## 2017-08-22 NOTE — ED NOTES
Right face numb/weak. Started yesterday afternoon. I performed a brief evaluation, including history and physical, of the patient here in triage and I have determined that pt will need further treatment and evaluation from the main side ER physician. I have placed initial orders to help in expediting patients care.      August 22, 2017 at 3:52 PM - Doug Moreira MD

## 2017-08-22 NOTE — DISCHARGE INSTRUCTIONS
Bell's Palsy: Care Instructions  Your Care Instructions    Bell's palsy is paralysis or weakness of the muscles on one side of the face. Often people with Bell's palsy have a droop on one side of the mouth and have trouble completely shutting the eye on the same side. Bell's palsy can also interfere with the sense of taste. These things happen when a nerve in your face becomes inflamed. Bell's palsy is not caused by a stroke. The cause of the nerve inflammation is not known. But some experts think that a virus may cause it. Because of this, doctors sometimes prescribe antiviral medicine to treat it. You also may get medicine to reduce swelling. Bell's palsy usually gets better on its own in a few weeks or months. Follow-up care is a key part of your treatment and safety. Be sure to make and go to all appointments, and call your doctor if you are having problems. It's also a good idea to know your test results and keep a list of the medicines you take. How can you care for yourself at home? · Take your medicines exactly as prescribed. Call your doctor if you think you are having a problem with your medicine. You will get more details on the specific medicines your doctor prescribes. · Use artificial tears or ointment if your eyes are too dry. Bell's palsy can make your lower eyelid droop, causing a dry eye. · If you cannot completely close your eye, consider using an eye patch while you sleep. · Help yourself blink by using your finger to close and open your eyelid. This may help keep your eye moist.  · Wear glasses or goggles to keep dust and dirt out of your eye. · As feeling comes back to your face, massage your forehead, cheeks, and lips. Massage may make the muscles in your face stronger. · Brush and floss your teeth often to help prevent tooth decay. Bell's palsy can dry up the spit on one side of your mouth. This increases the risk of tooth decay. When should you call for help?   Call 911 anytime you think you may need emergency care. For example, call if:  · You have symptoms of a stroke. These may include:  ¨ Sudden numbness, tingling, weakness, or loss of movement in your face, arm, or leg, especially on only one side of your body. ¨ Sudden vision changes. ¨ Sudden trouble speaking. ¨ Sudden confusion or trouble understanding simple statements. ¨ Sudden problems with walking or balance. ¨ A sudden, severe headache that is different from past headaches. Call your doctor now or seek immediate medical care if:  · You have numbness or weakness that spreads beyond one side of your face. · You have a skin rash or eye pain or redness, or light bothers your eyes. · You have a new or worse headache. Watch closely for changes in your health, and be sure to contact your doctor if:  · You do not get better as expected. Where can you learn more? Go to http://curtis-sherrie.info/. Enter P168 in the search box to learn more about \"Bell's Palsy: Care Instructions. \"  Current as of: October 14, 2016  Content Version: 11.3  © 7964-5761 [employer]. Care instructions adapted under license by [employer] (which disclaims liability or warranty for this information). If you have questions about a medical condition or this instruction, always ask your healthcare professional. [place] [address] any warranty or liability for your use of this information. Elevated Blood Pressure: Care Instructions  Your Care Instructions    Blood pressure is a measure of how hard the blood pushes against the walls of your arteries. It's normal for blood pressure to go up and down throughout the day. But if it stays up over time, you have high blood pressure. Two numbers tell you your blood pressure. The first number is the systolic pressure. It shows how hard the blood pushes when your heart is pumping. The second number is the diastolic pressure.  It shows how hard the blood pushes between heartbeats, when your heart is relaxed and filling with blood. An ideal blood pressure in adults is less than 120/80 (say \"120 over 80\"). High blood pressure is 140/90 or higher. You have high blood pressure if your top number is 140 or higher or your bottom number is 90 or higher, or both. The main test for high blood pressure is simple, fast, and painless. To diagnose high blood pressure, your doctor will test your blood pressure at different times. After testing your blood pressure, your doctor may ask you to test it again when you are home. If you are diagnosed with high blood pressure, you can work with your doctor to make a long-term plan to manage it. Follow-up care is a key part of your treatment and safety. Be sure to make and go to all appointments, and call your doctor if you are having problems. It's also a good idea to know your test results and keep a list of the medicines you take. How can you care for yourself at home? · Do not smoke. Smoking increases your risk for heart attack and stroke. If you need help quitting, talk to your doctor about stop-smoking programs and medicines. These can increase your chances of quitting for good. · Stay at a healthy weight. · Try to limit how much sodium you eat to less than 2,300 milligrams (mg) a day. Your doctor may ask you to try to eat less than 1,500 mg a day. · Be physically active. Get at least 30 minutes of exercise on most days of the week. Walking is a good choice. You also may want to do other activities, such as running, swimming, cycling, or playing tennis or team sports. · Avoid or limit alcohol. Talk to your doctor about whether you can drink any alcohol. · Eat plenty of fruits, vegetables, and low-fat dairy products. Eat less saturated and total fats. · Learn how to check your blood pressure at home. When should you call for help?   Call your doctor now or seek immediate medical care if:  · Your blood pressure is much higher than normal (such as 180/110 or higher). · You think high blood pressure is causing symptoms such as:  ¨ Severe headache. ¨ Blurry vision. Watch closely for changes in your health, and be sure to contact your doctor if:  · You do not get better as expected. Where can you learn more? Go to http://curtis-sherrie.info/. Enter S924 in the search box to learn more about \"Elevated Blood Pressure: Care Instructions. \"  Current as of: April 3, 2017  Content Version: 11.3  © 1055-0119 [employer]. Care instructions adapted under license by [employer] (which disclaims liability or warranty for this information). If you have questions about a medical condition or this instruction, always ask your healthcare professional. [address] any warranty or liability for your use of this information.

## 2017-08-22 NOTE — ED TRIAGE NOTES
Pt reports since 5 or 6 pm last night her face started to feel numb, she did not give it too much thought. Went to bed and still feels like she has the numbness, she noted asymmetrical smile and had a hard time using her straw today. Family history of stroke.

## 2017-08-22 NOTE — ED NOTES
Pt to CT immediately from Triage accompanied by this RN.  5589 back to ED bed 18 - attached to monitor, PIV initiated

## 2017-08-22 NOTE — ED PROVIDER NOTES
HPI Comments: The patient is a 42 y.o. female with a history of HTN, migraine presents to the ED with complaints of numbness of the lower half of her face. The patient states that she was playing with her grandchildren and was making a \"fish face\" when she felt something did not feel right. The patient is concerned that she is having a stroke. The patient states that she has also a constant headache that she describes as \"a tightness. \" Patient states that her symptoms began at approximately 1800 yesterday. Denies any tick bite. Patient is a 42 y.o. female presenting with numbness. Numbness   Associated symptoms include headaches. Pertinent negatives include no shortness of breath and no chest pain. Past Medical History:   Diagnosis Date    Anemia NEC     Arthritis     Dyspepsia and other specified disorders of function of stomach     Endocrine disease     anemic    Kidney stone        Past Surgical History:   Procedure Laterality Date    ABDOMEN SURGERY PROC UNLISTED  12/24/2012    cholecystectomy    HX CHOLECYSTECTOMY  2012    HX GYN  1992    ceserean    HX GYN  2001    tubal    HX OTHER SURGICAL      tubal ligation    [place] ERCP REMOVE CALCULI/DEBRIS BILIARY/PANCREAS DUCT  12/26/2012         [place] ERCP W/SPHINCTEROTOMY/PAPILLOTOMY  12/26/2012              Family History:   Problem Relation Age of Onset    Hypertension Mother     Stroke Father     Hypertension Father     Heart Disease Father     Cancer Father      leukemia    Heart Attack Other      grandfather - 76s    Heart Disease Daughter        Social History     Social History    Marital status: SINGLE     Spouse name: N/A    Number of children: N/A    Years of education: N/A     Occupational History    Not on file.      Social History Main Topics    Smoking status: Never Smoker    Smokeless tobacco: Not on file    Alcohol use No    Drug use: No    Sexual activity: Not on file     Other Topics Concern    Not on file Social History Narrative    ** Merged History Encounter **              ALLERGIES: Codeine and Lactose    Review of Systems   Constitutional: Negative for fever. HENT: Negative for nosebleeds. Eyes: Negative for visual disturbance. Respiratory: Negative for shortness of breath. Cardiovascular: Negative for chest pain. Gastrointestinal: Negative for blood in stool. Genitourinary: Negative for dysuria. Musculoskeletal: Negative for myalgias. Skin: Negative for rash. Neurological: Positive for numbness (lower half of face) and headaches. All other systems reviewed and are negative. Vitals:    08/22/17 1551   BP: (!) 156/99   Pulse: (!) 101   Resp: 16   Temp: 98 °F (36.7 °C)   SpO2: 100%   Weight: 112.9 kg (249 lb)   Height: 5' 2\" (1.575 m)            Physical Exam   Constitutional:   General:  Well-developed, well-nourished, no apparent distress. Head:  Normocephalic atraumatic. Eyes:  Pupils midrange extraocular movements intact. No pallor or conjunctival injection. Nose:  No rhinorrhea, inspection grossly normal.    Ears:  Grossly normal to inspection, no discharge. Mouth:  Mucous membranes moist, no appreciable intraoral lesion. Neck/Back:  Trachea midline, no asymmetry. Chest:  Grossly normal inspection, symmetric chest rise. Pulmonary:  Clear to auscultation bilaterally no wheezes rhonchi or rales. Cardiovascular:  S1-S2 no murmurs rubs or gallops. Abdomen: Soft, nontender, nondistended no guarding rebound or peritoneal signs.     Extremities:  Grossly normal to inspection, peripheral pulses intact    Neurologic:  Alert and oriented  Able to answer name and date correctly  Performs eye closing and hand squeeze tasks  No dysarthria or aphasia  Symmetric smile, uvula midline, no blunting of nasolabial fold intact on LEFT but decreased on RIGHT, incomplete paralysis of upper  Facial sensation grossly intact to light touch intact on LEFT but decreased on RIGHT  UPPER extremity:  Bilateral arm raised off bed and held against gravity for 5 seconds   strong and equal  No past pointing  Sensation grossly intact to light touch  LOWER extremity:  Bilateral leg raises off bed and held against gravity for 5 seconds  Gait is not ataxic nonantalgic  Sensation grossly intact to light touch. Skin:  Warm and dry  Psychiatric:  Grossly normal mood and affect. Nursing note reviewed, vital signs reviewed. MDM  Number of Diagnoses or Management Options  Bell's palsy:   Elevated blood pressure reading:   Diagnosis management comments: ED course:  Patient presents with likely stroke, facial numbness and weakness, symptoms have been present for approximately 24 hours, not TPA candidate, we'll proceed with stroke evaluation    1610 Consult:  Discussed care with Dr. Cary Johnson. Standard discussion; including history of patient's chief complaint, available diagnostic results, and treatment course. Per radiology CT head negative    Patient seen by tele-neurology, felt to be Bell's palsy, recommends outpatient management steroids and agrees with sending lyme    EKG done at 1620 hrs.: Normal sinus rhythm heart rate 95 intervals within normal limits no ST change or ectopy. Labs unremarkable    Chest x-ray per my interpretation no infiltrates or pneumothorax    Patient understands to watch for involvement of her eye use artificial tears, she is very familiar with this since she has a family history of the same. Patient's presentation, history, physical exam and laboratory evaluations were reviewed. At this time patient was felt to be stable for outpatient management and follow with primary care/specialist.  Patient was instructed to return to the emergency department with any concerns. Disposition:    Discharged home      Portions of this chart were created with Dragon medical speech to text program.   Unrecognized errors may be present.       ED Course Procedures                       Scribe [address] [place] [address] acting as a scribe for and in the presence of Severa Earthly, MD      August 22, 2017 at 4:03 PM       Provider Attestation:      I personally performed the services described in the documentation, reviewed the documentation, as recorded by the scribe in my presence, and it accurately and completely records my words and actions.  August 22, 2017 at 4:03 PM - Severa Earthly, MD

## 2017-08-23 LAB
ABO + RH BLD: NORMAL
ATRIAL RATE: 95 BPM
B BURGDOR IGG+IGM SER-ACNC: <0.91 ISR (ref 0–0.9)
B BURGDOR IGM SER IA-ACNC: <0.8 INDEX (ref 0–0.79)
BLOOD GROUP ANTIBODIES SERPL: NORMAL
CALCULATED P AXIS, ECG09: 24 DEGREES
CALCULATED R AXIS, ECG10: -3 DEGREES
CALCULATED T AXIS, ECG11: 14 DEGREES
DIAGNOSIS, 93000: NORMAL
P-R INTERVAL, ECG05: 164 MS
Q-T INTERVAL, ECG07: 354 MS
QRS DURATION, ECG06: 72 MS
QTC CALCULATION (BEZET), ECG08: 444 MS
SPECIMEN EXP DATE BLD: NORMAL
VENTRICULAR RATE, ECG03: 95 BPM

## 2017-08-30 ENCOUNTER — HOSPITAL ENCOUNTER (EMERGENCY)
Age: 47
Discharge: HOME | End: 2017-08-30
Attending: EMERGENCY MEDICINE
Payer: SELF-PAY

## 2017-08-30 VITALS
DIASTOLIC BLOOD PRESSURE: 69 MMHG | RESPIRATION RATE: 16 BRPM | SYSTOLIC BLOOD PRESSURE: 129 MMHG | HEIGHT: 61 IN | BODY MASS INDEX: 47.01 KG/M2 | WEIGHT: 249 LBS | OXYGEN SATURATION: 100 % | HEART RATE: 92 BPM | TEMPERATURE: 98.4 F

## 2017-08-30 DIAGNOSIS — G51.0 RIGHT-SIDED BELL'S PALSY: ICD-10-CM

## 2017-08-30 DIAGNOSIS — H53.8 BLURRED VISION, BILATERAL: Primary | ICD-10-CM

## 2017-08-30 PROCEDURE — 99284 EMERGENCY DEPT VISIT MOD MDM: CPT

## 2017-08-30 NOTE — ED PROVIDER NOTES
HPI Comments: 41yo F with recent diagnosis of Marcus Whitman presents today due to new onset of blurred vision. She had diagnosis last week after CT scan and evaluation by neurology, was treated with steroids and eye drops. Yesterday she noticed that her vision was intermittently blurring, mostly after reading. She has been using the eyedrops. She does not have any new dizziness, numbness or weakness. She continues to have partial facial paralysis. She has difficulty closing her eye completely and smiling. She has weakness in the facial muscles causing difficulty with speech,. She has also had a mild unrelenting headache for the past week. She has history of migraines. Denies nausea or vomiting. Patient is a 42 y.o. female presenting with blurred vision. Blurred Vision    Associated symptoms include blurred vision and weakness. Pertinent negatives include no numbness, no fever and no dizziness. Past Medical History:   Diagnosis Date    Anemia NEC     Arthritis     Dyspepsia and other specified disorders of function of stomach     Endocrine disease     anemic    Kidney stone        Past Surgical History:   Procedure Laterality Date    ABDOMEN SURGERY PROC UNLISTED  12/24/2012    cholecystectomy    HX CHOLECYSTECTOMY  2012    HX GYN  1992    ceserean    HX GYN  2001    tubal    HX OTHER SURGICAL      tubal ligation    [place] ERCP REMOVE CALCULI/DEBRIS BILIARY/PANCREAS DUCT  12/26/2012         [place] ERCP W/SPHINCTEROTOMY/PAPILLOTOMY  12/26/2012              Family History:   Problem Relation Age of Onset    Hypertension Mother     Stroke Father     Hypertension Father     Heart Disease Father     Cancer Father      leukemia    Heart Attack Other      grandfather - 76s    Heart Disease Daughter        Social History     Social History    Marital status: SINGLE     Spouse name: N/A    Number of children: N/A    Years of education: N/A     Occupational History    Not on file.      Social History Main Topics    Smoking status: Never Smoker    Smokeless tobacco: Not on file    Alcohol use No    Drug use: No    Sexual activity: Not on file     Other Topics Concern    Not on file     Social History Narrative    ** Merged History Encounter **              ALLERGIES: Codeine and Lactose    Review of Systems    Vitals:    08/30/17 1710 08/30/17 1745   BP: (!) 157/99 131/80   Pulse: 92    Resp: 16    Temp: 98.4 °F (36.9 °C)    SpO2: 100% 98%   Weight: 112.9 kg (249 lb)    Height: 5' 1\" (1.549 m)             Physical Exam     MDM  Number of Diagnoses or Management Options  Blurred vision, bilateral: new and requires workup  Right-sided Bell's palsy: new and requires workup  Diagnosis management comments: Diagnosed last week with [place] palsy, symptoms continue, new onset intermittent blurred vision. Possible due to dry eyes but will get tele-neuro consult to determine necessity of further imaging. Amount and/or Complexity of Data Reviewed  Clinical lab tests: ordered      ED Course       Procedures      PROGRESS NOTES:     -Discussed with tele-neuro Dr. Madeline Tao. He agrees that this is more likely related to her dry eyes from the [place] palsy as opposed to a central lesion. He recommends outpatient non-emergent MRI to rule out MS and f/u with her neurologist.  Sravanthi Leslie results of TSH due to complaints of fatigue.       -Discussed plan with patient. Consulted  to get assistance with insurance. Outpatient MRI and neurology appt with her neurologist Dr. Marleni Mcwilliams. Eye patch and eye drops. Instructed patient to Follow up on TSH level and  consult. Discussed with Dr. Ariella Garcia who agrees with outpatient plan. Return for any acute changes. Dandre Cannon PA-C      Diagnosis:   1. Blurred vision, bilateral    2.  Right-sided Bell's palsy          Disposition: home    Follow-up Information     Follow up With Details Comments Contact Info    Silvina Cuadra MD Schedule an appointment as soon as possible for a visit  [address] [facility] EMERGENCY DEPT  Immediately if symptoms worsen [address]  [phone]          Patient's Medications   Start Taking    No medications on file   Continue Taking    ALBUTEROL (PROVENTIL HFA, VENTOLIN HFA, PROAIR HFA) 90 MCG/ACTUATION INHALER    Take 2 Puffs by inhalation every four (4) hours as needed for Wheezing or Shortness of Breath. Indications: BRONCHOSPASM PREVENTION    BIOTIN 2,500 MCG CAP    Take  by mouth. OMEPRAZOLE (PRILOSEC) 40 MG CAPSULE    Take 40 mg by mouth daily. RANITIDINE (ZANTAC 75) 75 MG TABLET    Take 75 mg by mouth two (2) times a day.    These Medications have changed    No medications on file   Stop Taking    No medications on file     Dandre Cannon PA-C

## 2017-08-30 NOTE — Clinical Note
Wear eyepatch over right eye at night. Continue eyedrops 2 drops in each eye every 2 hours. Follow up with ER on TSH level. Follow up with neurologist 
Follow up with  on insurance Outpatient MRI

## 2017-08-31 NOTE — ED NOTES
Patient stated understanding of discharge instructions. Patient was ambulatory upon discharge.  Patient received one prescription for MRI    Patient armband removed and shredded

## 2017-08-31 NOTE — DISCHARGE INSTRUCTIONS
Return to ED if you develop any new or worsening symptoms such as severe or worsening headaches; somnolence or confusion; restlessness, unsteadiness, or seizures; difficulties with vision; vomiting, fever, or stiff neck; urinary or bowel incontinence; weakness or numbness involving any part of the body. Bell's Palsy: Care Instructions  Your Care Instructions    Bell's palsy is paralysis or weakness of the muscles on one side of the face. Often people with Bell's palsy have a droop on one side of the mouth and have trouble completely shutting the eye on the same side. Bell's palsy can also interfere with the sense of taste. These things happen when a nerve in your face becomes inflamed. Bell's palsy is not caused by a stroke. The cause of the nerve inflammation is not known. But some experts think that a virus may cause it. Because of this, doctors sometimes prescribe antiviral medicine to treat it. You also may get medicine to reduce swelling. Bell's palsy usually gets better on its own in a few weeks or months. Follow-up care is a key part of your treatment and safety. Be sure to make and go to all appointments, and call your doctor if you are having problems. It's also a good idea to know your test results and keep a list of the medicines you take. How can you care for yourself at home? · Take your medicines exactly as prescribed. Call your doctor if you think you are having a problem with your medicine. You will get more details on the specific medicines your doctor prescribes. · Use artificial tears or ointment if your eyes are too dry. Bell's palsy can make your lower eyelid droop, causing a dry eye. · If you cannot completely close your eye, consider using an eye patch while you sleep. · Help yourself blink by using your finger to close and open your eyelid. This may help keep your eye moist.  · Wear glasses or goggles to keep dust and dirt out of your eye.   · As feeling comes back to your face, massage your forehead, cheeks, and lips. Massage may make the muscles in your face stronger. · Brush and floss your teeth often to help prevent tooth decay. Bell's palsy can dry up the spit on one side of your mouth. This increases the risk of tooth decay. When should you call for help? Call 911 anytime you think you may need emergency care. For example, call if:  · You have symptoms of a stroke. These may include:  ¨ Sudden numbness, tingling, weakness, or loss of movement in your face, arm, or leg, especially on only one side of your body. ¨ Sudden vision changes. ¨ Sudden trouble speaking. ¨ Sudden confusion or trouble understanding simple statements. ¨ Sudden problems with walking or balance. ¨ A sudden, severe headache that is different from past headaches. Call your doctor now or seek immediate medical care if:  · You have numbness or weakness that spreads beyond one side of your face. · You have a skin rash or eye pain or redness, or light bothers your eyes. · You have a new or worse headache. Watch closely for changes in your health, and be sure to contact your doctor if:  · You do not get better as expected. Where can you learn more? Go to http://curtis-sherrie.info/. Enter P168 in the search box to learn more about \"Bell's Palsy: Care Instructions. \"  Current as of: October 14, 2016  Content Version: 11.3  © 0828-7010 [employer]. Care instructions adapted under license by [employer] (which disclaims liability or warranty for this information). If you have questions about a medical condition or this instruction, always ask your healthcare professional. [address] any warranty or liability for your use of this information.

## 2017-10-18 ENCOUNTER — APPOINTMENT (OUTPATIENT)
Dept: CT IMAGING | Age: 47
End: 2017-10-18
Attending: EMERGENCY MEDICINE
Payer: SELF-PAY

## 2017-10-18 ENCOUNTER — HOSPITAL ENCOUNTER (EMERGENCY)
Age: 47
Discharge: HOME | End: 2017-10-18
Attending: EMERGENCY MEDICINE
Payer: SELF-PAY

## 2017-10-18 VITALS
WEIGHT: 248 LBS | SYSTOLIC BLOOD PRESSURE: 133 MMHG | TEMPERATURE: 97.8 F | RESPIRATION RATE: 16 BRPM | HEIGHT: 62 IN | OXYGEN SATURATION: 99 % | HEART RATE: 93 BPM | DIASTOLIC BLOOD PRESSURE: 97 MMHG | BODY MASS INDEX: 45.64 KG/M2

## 2017-10-18 DIAGNOSIS — R11.2 NON-INTRACTABLE VOMITING WITH NAUSEA, UNSPECIFIED VOMITING TYPE: ICD-10-CM

## 2017-10-18 DIAGNOSIS — N13.2 URETERAL STONE WITH HYDRONEPHROSIS: Primary | ICD-10-CM

## 2017-10-18 LAB
ALBUMIN SERPL-MCNC: 3.6 G/DL (ref 3.4–5)
ALBUMIN/GLOB SERPL: 0.9 {RATIO} (ref 0.8–1.7)
ALP SERPL-CCNC: 146 U/L (ref 45–117)
ALT SERPL-CCNC: 27 U/L (ref 13–56)
ANION GAP SERPL CALC-SCNC: 7 MMOL/L (ref 3–18)
APPEARANCE UR: CLEAR
AST SERPL-CCNC: 13 U/L (ref 15–37)
BACTERIA URNS QL MICRO: ABNORMAL /HPF
BASOPHILS # BLD: 0 K/UL (ref 0–0.06)
BASOPHILS NFR BLD: 0 % (ref 0–2)
BILIRUB DIRECT SERPL-MCNC: 0.1 MG/DL (ref 0–0.2)
BILIRUB SERPL-MCNC: 0.4 MG/DL (ref 0.2–1)
BILIRUB UR QL: NEGATIVE
BUN SERPL-MCNC: 14 MG/DL (ref 7–18)
BUN/CREAT SERPL: 19 (ref 12–20)
CALCIUM SERPL-MCNC: 8.7 MG/DL (ref 8.5–10.1)
CHLORIDE SERPL-SCNC: 102 MMOL/L (ref 100–108)
CO2 SERPL-SCNC: 27 MMOL/L (ref 21–32)
COLOR UR: YELLOW
CREAT SERPL-MCNC: 0.75 MG/DL (ref 0.6–1.3)
DIFFERENTIAL METHOD BLD: ABNORMAL
EOSINOPHIL # BLD: 0.1 K/UL (ref 0–0.4)
EOSINOPHIL NFR BLD: 1 % (ref 0–5)
EPITH CASTS URNS QL MICRO: ABNORMAL /LPF (ref 0–5)
ERYTHROCYTE [DISTWIDTH] IN BLOOD BY AUTOMATED COUNT: 16.9 % (ref 11.6–14.5)
GLOBULIN SER CALC-MCNC: 3.8 G/DL (ref 2–4)
GLUCOSE SERPL-MCNC: 117 MG/DL (ref 74–99)
GLUCOSE UR STRIP.AUTO-MCNC: NEGATIVE MG/DL
HCT VFR BLD AUTO: 37.3 % (ref 35–45)
HGB BLD-MCNC: 11.7 G/DL (ref 12–16)
HGB UR QL STRIP: ABNORMAL
KETONES UR QL STRIP.AUTO: NEGATIVE MG/DL
LEUKOCYTE ESTERASE UR QL STRIP.AUTO: NEGATIVE
LIPASE SERPL-CCNC: 113 U/L (ref 73–393)
LYMPHOCYTES # BLD: 1.9 K/UL (ref 0.9–3.6)
LYMPHOCYTES NFR BLD: 15 % (ref 21–52)
MCH RBC QN AUTO: 22.9 PG (ref 24–34)
MCHC RBC AUTO-ENTMCNC: 31.4 G/DL (ref 31–37)
MCV RBC AUTO: 73.1 FL (ref 74–97)
MONOCYTES # BLD: 0.8 K/UL (ref 0.05–1.2)
MONOCYTES NFR BLD: 6 % (ref 3–10)
NEUTS SEG # BLD: 10.1 K/UL (ref 1.8–8)
NEUTS SEG NFR BLD: 78 % (ref 40–73)
NITRITE UR QL STRIP.AUTO: NEGATIVE
PH UR STRIP: 5.5 [PH] (ref 5–8)
PLATELET # BLD AUTO: 327 K/UL (ref 135–420)
PMV BLD AUTO: 9.9 FL (ref 9.2–11.8)
POTASSIUM SERPL-SCNC: 4.1 MMOL/L (ref 3.5–5.5)
PROT SERPL-MCNC: 7.4 G/DL (ref 6.4–8.2)
PROT UR STRIP-MCNC: NEGATIVE MG/DL
RBC # BLD AUTO: 5.1 M/UL (ref 4.2–5.3)
RBC #/AREA URNS HPF: ABNORMAL /HPF (ref 0–5)
SODIUM SERPL-SCNC: 136 MMOL/L (ref 136–145)
SP GR UR REFRACTOMETRY: 1.02 (ref 1–1.03)
UROBILINOGEN UR QL STRIP.AUTO: 0.2 EU/DL (ref 0.2–1)
WBC # BLD AUTO: 12.8 K/UL (ref 4.6–13.2)
WBC URNS QL MICRO: ABNORMAL /HPF (ref 0–4)
YEAST URNS QL MICRO: ABNORMAL

## 2017-10-18 PROCEDURE — 74176 CT ABD & PELVIS W/O CONTRAST: CPT

## 2017-10-18 PROCEDURE — 96361 HYDRATE IV INFUSION ADD-ON: CPT

## 2017-10-18 PROCEDURE — 83690 ASSAY OF LIPASE: CPT | Performed by: EMERGENCY MEDICINE

## 2017-10-18 PROCEDURE — 81001 URINALYSIS AUTO W/SCOPE: CPT | Performed by: EMERGENCY MEDICINE

## 2017-10-18 PROCEDURE — 80076 HEPATIC FUNCTION PANEL: CPT | Performed by: EMERGENCY MEDICINE

## 2017-10-18 PROCEDURE — 99284 EMERGENCY DEPT VISIT MOD MDM: CPT

## 2017-10-18 PROCEDURE — 80048 BASIC METABOLIC PNL TOTAL CA: CPT | Performed by: EMERGENCY MEDICINE

## 2017-10-18 PROCEDURE — 96374 THER/PROPH/DIAG INJ IV PUSH: CPT

## 2017-10-18 PROCEDURE — 74011250636 HC RX REV CODE- 250/636: Performed by: EMERGENCY MEDICINE

## 2017-10-18 PROCEDURE — 96375 TX/PRO/DX INJ NEW DRUG ADDON: CPT

## 2017-10-18 PROCEDURE — 87086 URINE CULTURE/COLONY COUNT: CPT | Performed by: EMERGENCY MEDICINE

## 2017-10-18 PROCEDURE — 85025 COMPLETE CBC W/AUTO DIFF WBC: CPT | Performed by: EMERGENCY MEDICINE

## 2017-10-18 RX ORDER — ONDANSETRON 4 MG/1
4-8 TABLET, ORALLY DISINTEGRATING ORAL
Qty: 15 TAB | Refills: 0 | Status: SHIPPED | OUTPATIENT
Start: 2017-10-18 | End: 2018-04-04

## 2017-10-18 RX ORDER — HYDROMORPHONE HYDROCHLORIDE 2 MG/ML
1 INJECTION, SOLUTION INTRAMUSCULAR; INTRAVENOUS; SUBCUTANEOUS
Status: COMPLETED | OUTPATIENT
Start: 2017-10-18 | End: 2017-10-18

## 2017-10-18 RX ORDER — IBUPROFEN 600 MG/1
600 TABLET ORAL
Qty: 20 TAB | Refills: 0 | Status: SHIPPED | OUTPATIENT
Start: 2017-10-18 | End: 2018-04-04

## 2017-10-18 RX ORDER — HYDROCODONE BITARTRATE AND ACETAMINOPHEN 5; 325 MG/1; MG/1
1-2 TABLET ORAL
Qty: 20 TAB | Refills: 0 | Status: SHIPPED | OUTPATIENT
Start: 2017-10-18 | End: 2018-04-04

## 2017-10-18 RX ORDER — ONDANSETRON 2 MG/ML
4 INJECTION INTRAMUSCULAR; INTRAVENOUS
Status: COMPLETED | OUTPATIENT
Start: 2017-10-18 | End: 2017-10-18

## 2017-10-18 RX ORDER — KETOROLAC TROMETHAMINE 30 MG/ML
30 INJECTION, SOLUTION INTRAMUSCULAR; INTRAVENOUS
Status: COMPLETED | OUTPATIENT
Start: 2017-10-18 | End: 2017-10-18

## 2017-10-18 RX ADMIN — KETOROLAC TROMETHAMINE 30 MG: 30 INJECTION, SOLUTION INTRAMUSCULAR at 01:26

## 2017-10-18 RX ADMIN — ONDANSETRON 4 MG: 2 INJECTION INTRAMUSCULAR; INTRAVENOUS at 01:25

## 2017-10-18 RX ADMIN — HYDROMORPHONE HYDROCHLORIDE 1 MG: 2 INJECTION, SOLUTION INTRAMUSCULAR; INTRAVENOUS; SUBCUTANEOUS at 01:21

## 2017-10-18 RX ADMIN — SODIUM CHLORIDE 1000 ML: 900 INJECTION, SOLUTION INTRAVENOUS at 01:25

## 2017-10-18 NOTE — LETTER
NOTIFICATION RETURN TO WORK / SCHOOL 
 
10/18/2017 0400 AM 
 
Ms. Jessica Calvillo [address] [id number] To Whom It May Concern: 
 
Jessica Calvillo is currently under the care of [facility] EMERGENCY DEPT. She will return to work/school on: 10/20/17 If there are questions or concerns please have the patient contact our office. Sincerely, Mahendra Mtz MD

## 2017-10-18 NOTE — ED NOTES
At time of dc pts pain improved. Pt discharged during [facility] care downtime. I have reviewed discharge instructions with the patient. The patient verbalized understanding.

## 2017-10-18 NOTE — ED PROVIDER NOTES
HPI Comments: Mat Durham is a 41 y.o. Female with c/o abrupt onset of llq pain that started about 2 hours ago with nv and urinary urgency. No fever, recent hematuria, fcs, diarrhea, blood in stool. Pain is sharp, worse with movement. H/o of kidney stones and this feels similar. Remote h/o tubal no other sig abd issues. Donora well yesterday    The history is provided by the patient and medical records. Past Medical History:   Diagnosis Date    Anemia NEC     Arthritis     Bell's palsy     Dyspepsia and other specified disorders of function of stomach     Endocrine disease     anemic    Kidney stone        Past Surgical History:   Procedure Laterality Date    ABDOMEN SURGERY PROC UNLISTED  12/24/2012    cholecystectomy    HX CHOLECYSTECTOMY  2012    HX GYN  1992    ceserean    HX GYN  2001    tubal    HX OTHER SURGICAL      tubal ligation    [place] ERCP REMOVE CALCULI/DEBRIS BILIARY/PANCREAS DUCT  12/26/2012         [place] ER[place] W/SPHINCTEROTOMY/PAPILLOTOMY  12/26/2012              Family History:   Problem Relation Age of Onset    Hypertension Mother     Stroke Father     Hypertension Father     Heart Disease Father     Cancer Father      leukemia    Heart Attack Other      grandfather - 76s    Heart Disease Daughter        Social History     Social History    Marital status: SINGLE     Spouse name: N/A    Number of children: N/A    Years of education: N/A     Occupational History    Not on file. Social History Main Topics    Smoking status: Never Smoker    Smokeless tobacco: Not on file    Alcohol use No    Drug use: No    Sexual activity: Not on file     Other Topics Concern    Not on file     Social History Narrative    ** Merged History Encounter **              ALLERGIES: Codeine and Lactose    Review of Systems   Constitutional: Negative for fever. HENT: Negative for sore throat. Eyes: Negative for visual disturbance. Respiratory: Negative for cough.     Cardiovascular: Negative for chest pain. Gastrointestinal: Positive for abdominal pain. Negative for blood in stool. Endocrine: Negative for polyuria. Genitourinary: Positive for difficulty urinating and flank pain. Negative for vaginal bleeding. Musculoskeletal: Positive for back pain. Negative for gait problem. Skin: Negative for rash. Allergic/Immunologic: Negative for immunocompromised state. Neurological: Negative for syncope. Psychiatric/Behavioral: Positive for sleep disturbance. Vitals:    10/18/17 0119 10/18/17 0139   BP: (!) 133/97    Pulse: 93    Resp:  16   Temp: 97.8 °F (36.6 °C)    SpO2: 99%    Weight: 112.5 kg (248 lb)    Height: 5' 2\" (1.575 m)             Physical Exam   Constitutional: She is oriented to person, place, and time. She appears well-developed and well-nourished. No distress. HENT:   Head: Normocephalic and atraumatic. Right Ear: External ear normal.   Left Ear: External ear normal.   Nose: Nose normal.   Mouth/Throat: Uvula is midline, oropharynx is clear and moist and mucous membranes are normal.   Eyes: Conjunctivae are normal. No scleral icterus. Neck: Neck supple. Cardiovascular: Normal rate, regular rhythm, normal heart sounds and intact distal pulses. Pulmonary/Chest: Effort normal and breath sounds normal.   Abdominal: Soft. There is no hepatosplenomegaly. There is tenderness in the left lower quadrant. There is guarding and CVA tenderness. There is no rigidity and no rebound. Musculoskeletal: She exhibits no edema. Neurological: She is alert and oriented to person, place, and time. Gait normal.   Skin: Skin is warm and dry. She is not diaphoretic. Psychiatric: Her behavior is normal.   Nursing note and vitals reviewed.        [facility]  ED Course       Procedures    Vitals:  Patient Vitals for the past 12 hrs:   Temp Pulse Resp BP SpO2   10/18/17 0139 - - 16 - -   10/18/17 0119 97.8 °F (36.6 °C) 93 - (!) 133/97 99 %         Medications ordered:   Medications ketorolac (TORADOL) injection 30 mg (30 mg IntraVENous Given 10/18/17 0126)   HYDROmorphone (DILAUDID) injection 1 mg (1 mg IntraVENous Given 10/18/17 0121)   ondansetron (ZOFRAN) injection 4 mg (4 mg IntraVENous Given 10/18/17 0125)   sodium chloride 0.9 % bolus infusion 1,000 mL (0 mL IntraVENous IV Completed 10/18/17 0245)         Lab findings:  Recent Results (from the past 12 hour(s))   URINALYSIS W/ RFLX MICROSCOPIC    Collection Time: 10/18/17  1:10 AM   Result Value Ref Range    Color YELLOW      Appearance CLEAR      Specific gravity 1.024 1.005 - 1.030      pH (UA) 5.5 5.0 - 8.0      Protein NEGATIVE  NEG mg/dL    Glucose NEGATIVE  NEG mg/dL    Ketone NEGATIVE  NEG mg/dL    Bilirubin NEGATIVE  NEG      Blood MODERATE (A) NEG      Urobilinogen 0.2 0.2 - 1.0 EU/dL    Nitrites NEGATIVE  NEG      Leukocyte Esterase NEGATIVE  NEG     URINE MICROSCOPIC ONLY    Collection Time: 10/18/17  1:10 AM   Result Value Ref Range    WBC 0 to 3 0 - 4 /hpf    RBC 0 to 3 0 - 5 /hpf    Epithelial cells 1+ 0 - 5 /lpf    Bacteria 1+ (A) NEG /hpf    Yeast 2+ (A) NEG   CBC WITH AUTOMATED DIFF    Collection Time: 10/18/17  1:20 AM   Result Value Ref Range    WBC 12.8 4.6 - 13.2 K/uL    RBC 5.10 4.20 - 5.30 M/uL    HGB 11.7 (L) 12.0 - 16.0 g/dL    HCT 37.3 35.0 - 45.0 %    MCV 73.1 (L) 74.0 - 97.0 FL    MCH 22.9 (L) 24.0 - 34.0 PG    MCHC 31.4 31.0 - 37.0 g/dL    RDW 16.9 (H) 11.6 - 14.5 %    PLATELET 980 360 - 540 K/uL    MPV 9.9 9.2 - 11.8 FL    NEUTROPHILS 78 (H) 40 - 73 %    LYMPHOCYTES 15 (L) 21 - 52 %    MONOCYTES 6 3 - 10 %    EOSINOPHILS 1 0 - 5 %    BASOPHILS 0 0 - 2 %    ABS. NEUTROPHILS 10.1 (H) 1.8 - 8.0 K/UL    ABS. LYMPHOCYTES 1.9 0.9 - 3.6 K/UL    ABS. MONOCYTES 0.8 0.05 - 1.2 K/UL    ABS. EOSINOPHILS 0.1 0.0 - 0.4 K/UL    ABS.  BASOPHILS 0.0 0.0 - 0.06 K/UL    DF AUTOMATED     HEPATIC FUNCTION PANEL    Collection Time: 10/18/17  1:20 AM   Result Value Ref Range    Protein, total 7.4 6.4 - 8.2 g/dL    Albumin 3.6 3.4 - 5.0 g/dL    Globulin 3.8 2.0 - 4.0 g/dL    A-G Ratio 0.9 0.8 - 1.7      Bilirubin, total 0.4 0.2 - 1.0 MG/DL    Bilirubin, direct 0.1 0.0 - 0.2 MG/DL    Alk. phosphatase 146 (H) 45 - 117 U/L    AST (SGOT) 13 (L) 15 - 37 U/L    ALT (SGPT) 27 13 - 56 U/L   LIPASE    Collection Time: 10/18/17  1:20 AM   Result Value Ref Range    Lipase 113 73 - 597 U/L   METABOLIC PANEL, BASIC    Collection Time: 10/18/17  1:20 AM   Result Value Ref Range    Sodium 136 136 - 145 mmol/L    Potassium 4.1 3.5 - 5.5 mmol/L    Chloride 102 100 - 108 mmol/L    CO2 27 21 - 32 mmol/L    Anion gap 7 3.0 - 18 mmol/L    Glucose 117 (H) 74 - 99 mg/dL    BUN 14 7.0 - 18 MG/DL    Creatinine 0.75 0.6 - 1.3 MG/DL    BUN/Creatinine ratio 19 12 - 20      GFR est AA >60 >60 ml/min/1.73m2    GFR est non-AA >60 >60 ml/min/1.73m2    Calcium 8.7 8.5 - 10.1 MG/DL       EKG interpretation by ED Physician:      X-Ray, CT or other radiology findings or impressions:  CT ABD PELV WO CONT    (Results Pending)   mult stones both kidneys. Small stone distal left ureter with some hydro    Progress notes, Consult notes or additional Procedure notes:   Pain well controlled. Doubt need for other work up  I have discussed with patient and/or family/sig other the results, interpretation of any imaging if performed, suspected diagnosis and treatment plan to include instructions regarding the diagnoses listed to which understanding was expressed with all questions answered      Reevaluation of patient:   Stable for dc    Disposition:  Diagnosis:   1. Ureteral stone with hydronephrosis    2.  Non-intractable vomiting with nausea, unspecified vomiting type        Disposition: home      Follow-up Information     Follow up With Details Comments Contact Info    Yeison Nichole MD Schedule an appointment as soon as possible for a visit  [address] [place] [address]  [phone]              Discharge Medication List as of 10/18/2017  1:57 AM      START taking these medications    Details   HYDROcodone-acetaminophen (NORCO) 5-325 mg per tablet Take 1-2 Tabs by mouth every six (6) hours as needed for Pain. Max Daily Amount: 8 Tabs., Print, Disp-20 Tab, R-0      ondansetron (ZOFRAN ODT) 4 mg disintegrating tablet Take 1-2 Tabs by mouth every eight (8) hours as needed for Nausea. , Print, Disp-15 Tab, R-0      ibuprofen (MOTRIN) 600 mg tablet Take 1 Tab by mouth every six (6) hours as needed for Pain., Print, Disp-20 Tab, R-0         CONTINUE these medications which have NOT CHANGED    Details   albuterol (PROVENTIL HFA, VENTOLIN HFA, PROAIR HFA) 90 mcg/actuation inhaler Take 2 Puffs by inhalation every four (4) hours as needed for Wheezing or Shortness of Breath. Indications: BRONCHOSPASM PREVENTION, Print, Disp-1 Inhaler, R-0      raNITIdine (ZANTAC 75) 75 mg tablet Take 75 mg by mouth two (2) times a day., Historical Med      omeprazole (PRILOSEC) 40 mg capsule Take 40 mg by mouth daily. , Historical Med      Biotin 2,500 mcg cap Take  by mouth., Historical Med

## 2017-10-19 LAB
BACTERIA SPEC CULT: NORMAL
SERVICE CMNT-IMP: NORMAL

## 2017-11-26 ENCOUNTER — HOSPITAL ENCOUNTER (EMERGENCY)
Age: 47
Discharge: HOME | End: 2017-11-27
Attending: EMERGENCY MEDICINE | Admitting: EMERGENCY MEDICINE
Payer: SELF-PAY

## 2017-11-26 VITALS
RESPIRATION RATE: 16 BRPM | WEIGHT: 240 LBS | HEART RATE: 95 BPM | SYSTOLIC BLOOD PRESSURE: 177 MMHG | HEIGHT: 62 IN | BODY MASS INDEX: 44.16 KG/M2 | OXYGEN SATURATION: 99 % | DIASTOLIC BLOOD PRESSURE: 110 MMHG | TEMPERATURE: 97.7 F

## 2017-11-26 DIAGNOSIS — M79.632 PAIN OF LEFT FOREARM: Primary | ICD-10-CM

## 2017-11-26 DIAGNOSIS — R03.0 ELEVATED BLOOD PRESSURE READING: ICD-10-CM

## 2017-11-26 PROCEDURE — 99283 EMERGENCY DEPT VISIT LOW MDM: CPT

## 2017-11-26 PROCEDURE — 99282 EMERGENCY DEPT VISIT SF MDM: CPT

## 2017-11-26 RX ORDER — HYDROCHLOROTHIAZIDE 12.5 MG/1
12.5 TABLET ORAL DAILY
Qty: 14 TAB | Refills: 0 | Status: SHIPPED | OUTPATIENT
Start: 2017-11-26 | End: 2017-12-10

## 2017-11-26 RX ORDER — HYDROCODONE BITARTRATE AND ACETAMINOPHEN 5; 325 MG/1; MG/1
1 TABLET ORAL
Qty: 10 TAB | Refills: 0 | Status: SHIPPED | OUTPATIENT
Start: 2017-11-26 | End: 2018-04-04

## 2017-11-26 RX ORDER — HYDROCODONE BITARTRATE AND ACETAMINOPHEN 5; 325 MG/1; MG/1
1 TABLET ORAL
Status: COMPLETED | OUTPATIENT
Start: 2017-11-26 | End: 2017-11-27

## 2017-11-27 PROCEDURE — 74011250637 HC RX REV CODE- 250/637: Performed by: PHYSICIAN ASSISTANT

## 2017-11-27 PROCEDURE — L3809 WHFO W/O JOINTS PRE OTS: HCPCS

## 2017-11-27 RX ADMIN — HYDROCODONE BITARTRATE AND ACETAMINOPHEN 1 TABLET: 5; 325 TABLET ORAL at 00:11

## 2017-11-27 NOTE — ED PROVIDER NOTES
HPI Comments: Cierra Hill is a 41 y.o. female with a pertinent history of anemia, GERD, migraines, OA who presents to the emergency department for evaluation of atraumatic left forearm pain x 1 month. Pain causes her to be unable to  and lift anything heavy. She reports no injury/trauma/overuse. Pain spreads from the elbow to the left ring finger. She has not attempted any treatments at home. She does not work and does not perform any repetitive movements with her hands/arms. She relates chronic neck and back pain for years. She has not been taking any BP meds either. Pt denies any fevers or chills, dizziness or light headedness, ENT issues, CP or discomfort, SOB, cough, n/v/d/c, abd pain, back pain, diaphoresis, melena/hematochezia, dysuria, hematuria, frequency, focal weakness/numbness/tingling, or rash. Patient has no other complaints at this time. PCP:  Linnea Nicholson MD        Patient is a 41 y.o. female presenting with arm redness. Arm Problem   Pertinent negatives include no chest pain, no abdominal pain, no headaches and no shortness of breath.         Past Medical History:   Diagnosis Date    Anemia NEC     Arthritis     Bell's palsy     Dyspepsia and other specified disorders of function of stomach     Endocrine disease     anemic    Kidney stone        Past Surgical History:   Procedure Laterality Date    ABDOMEN SURGERY PROC UNLISTED  12/24/2012    cholecystectomy    HX CHOLECYSTECTOMY  2012    HX GYN  1992    ceserean    HX GYN  2001    tubal    HX OTHER SURGICAL      tubal ligation    [place] REMOVE CALCULI/DEBRIS BILIARY/PANCREAS DUCT  12/26/2012         [place] W/SPHINCTEROTOMY/PAPILLOTOMY  12/26/2012              Family History:   Problem Relation Age of Onset    Hypertension Mother     Stroke Father     Hypertension Father     Heart Disease Father     Cancer Father      leukemia    Heart Attack Other      grandfather - 76s    Heart Disease Daughter        Social History     Social History    Marital status: SINGLE     Spouse name: N/A    Number of children: N/A    Years of education: N/A     Occupational History    Not on file. Social History Main Topics    Smoking status: Never Smoker    Smokeless tobacco: Never Used    Alcohol use No    Drug use: No    Sexual activity: Not on file     Other Topics Concern    Not on file     Social History Narrative    ** Merged History Encounter **              ALLERGIES: Codeine and Lactose    Review of Systems   Constitutional: Negative for chills and fever. HENT: Negative for congestion, rhinorrhea and sore throat. Respiratory: Negative for cough and shortness of breath. Cardiovascular: Negative for chest pain. Gastrointestinal: Negative for abdominal pain, blood in stool, constipation, diarrhea, nausea and vomiting. Genitourinary: Negative for dysuria, frequency and hematuria. Musculoskeletal: Positive for arthralgias. Negative for back pain and myalgias. Skin: Negative for rash and wound. Neurological: Negative for dizziness and headaches. Vitals:    11/26/17 2232   BP: (!) 177/110   Pulse: 95   Resp: 16   Temp: 97.7 °F (36.5 °C)   SpO2: 99%   Weight: 108.9 kg (240 lb)   Height: 5' 2\" (1.575 m)            Physical Exam   Constitutional: She is oriented to person, place, and time. She appears well-developed and well-nourished. No distress. HENT:   Head: Normocephalic and atraumatic. Eyes: Conjunctivae are normal.   Neck: Normal range of motion. Neck supple. Cardiovascular: Normal rate, regular rhythm and normal heart sounds. Pulmonary/Chest: Effort normal and breath sounds normal. No respiratory distress. She has no wheezes. She has no rales. She exhibits no tenderness. Musculoskeletal: Normal range of motion. She exhibits no edema, tenderness or deformity. Left forearm is non-TTP.  No obvious deformity, edema, ecchymosis, erythema, or overlying skin changes noted on exam.  Pt is moving LUE with 5/5 strength and FROM against resistance in flexion and extension. Pt is neurovascularly intact distally with cap refill < 3 seconds and intact sensation. Neurological: She is alert and oriented to person, place, and time. She has normal reflexes. Skin: Skin is warm and dry. She is not diaphoretic. Psychiatric: She has a normal mood and affect. Nursing note and vitals reviewed. MDM  Number of Diagnoses or Management Options  Elevated blood pressure reading: new and does not require workup  Pain of left forearm: new and does not require workup  Diagnosis management comments: Differential Diagnosis: fracture, dislocation, tendinous/ligamentous tear/strain, contusion, OA, RA, gout    Plan:  Pt presents ambulatory in [facility], well-hydrated, non-toxic in appearance, with elevated BP and otherwise normal vitals. Unremarkable physical exam.  Symptoms x 1 month. Possibly related to chronic tendinitis or radiculopathy. Pt has neurologist.  She is advised she may need MRI. No indication for emergent MRI at this point. At this time, patient is stable and appropriate for discharge home. Patient demonstrates understanding of current diagnoses and is in agreement with the treatment plan. They are advised that while the likelihood of serious underlying condition is low at this point given the evaluation performed today, we cannot fully rule it out. They are advised to immediately return with any new symptoms or worsening of current condition. All questions have been answered. Patient is given educational material regarding their diagnoses, including danger symptoms and when to return to the ED.          Amount and/or Complexity of Data Reviewed  Review and summarize past medical records: yes    Risk of Complications, Morbidity, and/or Mortality  Presenting problems: low  Diagnostic procedures: minimal  Management options: low    Patient Progress  Patient progress: improved    ED Course Procedures      -------------------------------------------------------------------------------------------------------------------  Orders:  Orders Placed This Encounter    APPLY SPLINT: SPECIFY     velcro left wrist     Standing Status:   Standing     Number of Occurrences:   1    HYDROcodone-acetaminophen (NORCO) 5-325 mg per tablet 1 Tab    HYDROcodone-acetaminophen (NORCO) 5-325 mg per tablet     Sig: Take 1 Tab by mouth every four (4) hours as needed for Pain. Max Daily Amount: 6 Tabs. Dispense:  10 Tab     Refill:  0    hydroCHLOROthiazide (HYDRODIURIL) 12.5 mg tablet     Sig: Take 1 Tab by mouth daily for 14 days. Dispense:  14 Tab     Refill:  0      Progress Notes:  11:32 PM:  Sussy Chowdhury PA-C was at the pt's bedside, assessed the pt and answered the pt's questions regarding treatment.    -------------------------------------------------------------------------------------------------------------------    Disposition:  Diagnosis:   1. Pain of left forearm    2. Elevated blood pressure reading        Disposition: [place] Home    Follow-up Information     Follow up With Details Comments [facility] Call in 1 day To discuss need for follow-up and insurance [address] EMERGENCY DEPT Go to As needed, If symptoms worsen [address] [phone]          Patient's Medications   Start Taking    HYDROCHLOROTHIAZIDE (HYDRODIURIL) 12.5 MG TABLET    Take 1 Tab by mouth daily for 14 days. HYDROCODONE-ACETAMINOPHEN (NORCO) 5-325 MG PER TABLET    Take 1 Tab by mouth every four (4) hours as needed for Pain. Max Daily Amount: 6 Tabs. Continue Taking    ALBUTEROL (PROVENTIL HFA, VENTOLIN HFA, PROAIR HFA) 90 MCG/ACTUATION INHALER    Take 2 Puffs by inhalation every four (4) hours as needed for Wheezing or Shortness of Breath.  Indications: BRONCHOSPASM PREVENTION    BIOTIN 2,500 MCG CAP    Take  by mouth.    HYDROCODONE-ACETAMINOPHEN (NORCO) 5-325 MG PER TABLET    Take 1-2 Tabs by mouth every six (6) hours as needed for Pain. Max Daily Amount: 8 Tabs. IBUPROFEN (MOTRIN) 600 MG TABLET    Take 1 Tab by mouth every six (6) hours as needed for Pain. OMEPRAZOLE (PRILOSEC) 40 MG CAPSULE    Take 40 mg by mouth daily. ONDANSETRON (ZOFRAN ODT) 4 MG DISINTEGRATING TABLET    Take 1-2 Tabs by mouth every eight (8) hours as needed for Nausea. RANITIDINE (ZANTAC 75) 75 MG TABLET    Take 75 mg by mouth two (2) times a day.    These Medications have changed    No medications on file   Stop Taking    No medications on file

## 2017-11-27 NOTE — ED TRIAGE NOTES
\"It has been going on for a few weeks, my left arm, when I stretch it out it pulls from my elbow to my ring finger, when I go to pick something up I almost drop it, when I squeeze my hand shut it opens up, I can't hold any thing with my left hand\"

## 2017-11-27 NOTE — DISCHARGE INSTRUCTIONS
Elevated Blood Pressure: Care Instructions  Your Care Instructions    Blood pressure is a measure of how hard the blood pushes against the walls of your arteries. It's normal for blood pressure to go up and down throughout the day. But if it stays up over time, you have high blood pressure. Two numbers tell you your blood pressure. The first number is the systolic pressure. It shows how hard the blood pushes when your heart is pumping. The second number is the diastolic pressure. It shows how hard the blood pushes between heartbeats, when your heart is relaxed and filling with blood. An ideal blood pressure in adults is less than 120/80 (say \"120 over 80\"). High blood pressure is 140/90 or higher. You have high blood pressure if your top number is 140 or higher or your bottom number is 90 or higher, or both. The main test for high blood pressure is simple, fast, and painless. To diagnose high blood pressure, your doctor will test your blood pressure at different times. After testing your blood pressure, your doctor may ask you to test it again when you are home. If you are diagnosed with high blood pressure, you can work with your doctor to make a long-term plan to manage it. Follow-up care is a key part of your treatment and safety. Be sure to make and go to all appointments, and call your doctor if you are having problems. It's also a good idea to know your test results and keep a list of the medicines you take. How can you care for yourself at home? · Do not smoke. Smoking increases your risk for heart attack and stroke. If you need help quitting, talk to your doctor about stop-smoking programs and medicines. These can increase your chances of quitting for good. · Stay at a healthy weight. · Try to limit how much sodium you eat to less than 2,300 milligrams (mg) a day. Your doctor may ask you to try to eat less than 1,500 mg a day. · Be physically active.  Get at least 30 minutes of exercise on most days of the week. Walking is a good choice. You also may want to do other activities, such as running, swimming, cycling, or playing tennis or team sports. · Avoid or limit alcohol. Talk to your doctor about whether you can drink any alcohol. · Eat plenty of fruits, vegetables, and low-fat dairy products. Eat less saturated and total fats. · Learn how to check your blood pressure at home. When should you call for help? Call your doctor now or seek immediate medical care if:  ? · Your blood pressure is much higher than normal (such as 180/110 or higher). ? · You think high blood pressure is causing symptoms such as:  ¨ Severe headache. ¨ Blurry vision. ? Watch closely for changes in your health, and be sure to contact your doctor if:  ? · You do not get better as expected. Where can you learn more? Go to http://curtis-sherrie.info/. Enter F469 in the search box to learn more about \"Elevated Blood Pressure: Care Instructions. \"  Current as of: September 21, 2016  Content Version: 11.4  © 7072-4274 [employer]. Care instructions adapted under license by [employer] (which disclaims liability or warranty for this information). If you have questions about a medical condition or this instruction, always ask your healthcare professional. [address] any warranty or liability for your use of this information.

## 2018-04-04 ENCOUNTER — APPOINTMENT (OUTPATIENT)
Dept: GENERAL RADIOLOGY | Age: 48
End: 2018-04-04
Attending: EMERGENCY MEDICINE
Payer: SELF-PAY

## 2018-04-04 ENCOUNTER — HOSPITAL ENCOUNTER (EMERGENCY)
Age: 48
Discharge: HOME | End: 2018-04-04
Attending: EMERGENCY MEDICINE
Payer: SELF-PAY

## 2018-04-04 VITALS
DIASTOLIC BLOOD PRESSURE: 92 MMHG | WEIGHT: 260 LBS | RESPIRATION RATE: 16 BRPM | SYSTOLIC BLOOD PRESSURE: 158 MMHG | HEIGHT: 61 IN | TEMPERATURE: 98 F | HEART RATE: 96 BPM | BODY MASS INDEX: 49.09 KG/M2 | OXYGEN SATURATION: 100 %

## 2018-04-04 DIAGNOSIS — R73.9 HYPERGLYCEMIA: ICD-10-CM

## 2018-04-04 DIAGNOSIS — I10 ESSENTIAL HYPERTENSION: ICD-10-CM

## 2018-04-04 DIAGNOSIS — R60.0 BILATERAL LEG EDEMA: ICD-10-CM

## 2018-04-04 DIAGNOSIS — R06.00 DYSPNEA, UNSPECIFIED TYPE: Primary | ICD-10-CM

## 2018-04-04 LAB
ALBUMIN SERPL-MCNC: 3.2 G/DL (ref 3.4–5)
ALBUMIN/GLOB SERPL: 0.8 {RATIO} (ref 0.8–1.7)
ALP SERPL-CCNC: 124 U/L (ref 45–117)
ALT SERPL-CCNC: 27 U/L (ref 13–56)
ANION GAP SERPL CALC-SCNC: 7 MMOL/L (ref 3–18)
AST SERPL-CCNC: 40 U/L (ref 15–37)
BASOPHILS # BLD: 0 K/UL (ref 0–0.06)
BASOPHILS NFR BLD: 0 % (ref 0–2)
BILIRUB SERPL-MCNC: 0.6 MG/DL (ref 0.2–1)
BNP SERPL-MCNC: 113 PG/ML (ref 0–450)
BUN SERPL-MCNC: 16 MG/DL (ref 7–18)
BUN/CREAT SERPL: 24 (ref 12–20)
CALCIUM SERPL-MCNC: 8.7 MG/DL (ref 8.5–10.1)
CHLORIDE SERPL-SCNC: 104 MMOL/L (ref 100–108)
CK MB CFR SERPL CALC: NORMAL % (ref 0–4)
CK MB SERPL-MCNC: <1 NG/ML (ref 5–25)
CK SERPL-CCNC: 90 U/L (ref 26–192)
CO2 SERPL-SCNC: 26 MMOL/L (ref 21–32)
CREAT SERPL-MCNC: 0.67 MG/DL (ref 0.6–1.3)
DIFFERENTIAL METHOD BLD: ABNORMAL
EOSINOPHIL # BLD: 0.1 K/UL (ref 0–0.4)
EOSINOPHIL NFR BLD: 1 % (ref 0–5)
ERYTHROCYTE [DISTWIDTH] IN BLOOD BY AUTOMATED COUNT: 16.4 % (ref 11.6–14.5)
GLOBULIN SER CALC-MCNC: 4.2 G/DL (ref 2–4)
GLUCOSE SERPL-MCNC: 132 MG/DL (ref 74–99)
HCG SERPL QL: NEGATIVE
HCT VFR BLD AUTO: 37.6 % (ref 35–45)
HGB BLD-MCNC: 11.9 G/DL (ref 12–16)
LYMPHOCYTES # BLD: 2 K/UL (ref 0.9–3.6)
LYMPHOCYTES NFR BLD: 23 % (ref 21–52)
MCH RBC QN AUTO: 24.3 PG (ref 24–34)
MCHC RBC AUTO-ENTMCNC: 31.6 G/DL (ref 31–37)
MCV RBC AUTO: 76.9 FL (ref 74–97)
MONOCYTES # BLD: 0.6 K/UL (ref 0.05–1.2)
MONOCYTES NFR BLD: 7 % (ref 3–10)
NEUTS SEG # BLD: 6 K/UL (ref 1.8–8)
NEUTS SEG NFR BLD: 69 % (ref 40–73)
PLATELET # BLD AUTO: 272 K/UL (ref 135–420)
PMV BLD AUTO: 9.4 FL (ref 9.2–11.8)
POTASSIUM SERPL-SCNC: 5.2 MMOL/L (ref 3.5–5.5)
PROT SERPL-MCNC: 7.4 G/DL (ref 6.4–8.2)
RBC # BLD AUTO: 4.89 M/UL (ref 4.2–5.3)
SODIUM SERPL-SCNC: 137 MMOL/L (ref 136–145)
TROPONIN I SERPL-MCNC: <0.02 NG/ML (ref 0–0.04)
TSH SERPL DL<=0.05 MIU/L-ACNC: 1.85 UIU/ML (ref 0.36–3.74)
WBC # BLD AUTO: 8.8 K/UL (ref 4.6–13.2)

## 2018-04-04 PROCEDURE — 74011250637 HC RX REV CODE- 250/637: Performed by: PHYSICIAN ASSISTANT

## 2018-04-04 PROCEDURE — 83880 ASSAY OF NATRIURETIC PEPTIDE: CPT | Performed by: EMERGENCY MEDICINE

## 2018-04-04 PROCEDURE — 99283 EMERGENCY DEPT VISIT LOW MDM: CPT

## 2018-04-04 PROCEDURE — 84484 ASSAY OF TROPONIN QUANT: CPT | Performed by: EMERGENCY MEDICINE

## 2018-04-04 PROCEDURE — 84443 ASSAY THYROID STIM HORMONE: CPT | Performed by: EMERGENCY MEDICINE

## 2018-04-04 PROCEDURE — 71046 X-RAY EXAM CHEST 2 VIEWS: CPT

## 2018-04-04 PROCEDURE — 84703 CHORIONIC GONADOTROPIN ASSAY: CPT | Performed by: EMERGENCY MEDICINE

## 2018-04-04 PROCEDURE — 85025 COMPLETE CBC W/AUTO DIFF WBC: CPT | Performed by: EMERGENCY MEDICINE

## 2018-04-04 PROCEDURE — 93005 ELECTROCARDIOGRAM TRACING: CPT

## 2018-04-04 PROCEDURE — 80053 COMPREHEN METABOLIC PANEL: CPT | Performed by: EMERGENCY MEDICINE

## 2018-04-04 RX ORDER — HYDROCHLOROTHIAZIDE 12.5 MG/1
12.5 TABLET ORAL DAILY
Qty: 20 TAB | Refills: 0 | Status: SHIPPED | OUTPATIENT
Start: 2018-04-04 | End: 2018-04-24

## 2018-04-04 RX ORDER — HYDROCHLOROTHIAZIDE 25 MG/1
12.5 TABLET ORAL
Status: COMPLETED | OUTPATIENT
Start: 2018-04-04 | End: 2018-04-04

## 2018-04-04 RX ADMIN — HYDROCHLOROTHIAZIDE 12.5 MG: 25 TABLET ORAL at 22:11

## 2018-04-04 NOTE — ED TRIAGE NOTES
Patient states SOB onset today, \"feels tight and cannot take a deep breath    Bilateral ankle and feet swelling x one week    Patient states she has gained about 20 pounds in the past month

## 2018-04-05 LAB
ATRIAL RATE: 95 BPM
CALCULATED P AXIS, ECG09: 43 DEGREES
CALCULATED R AXIS, ECG10: 21 DEGREES
CALCULATED T AXIS, ECG11: 24 DEGREES
DIAGNOSIS, 93000: NORMAL
P-R INTERVAL, ECG05: 168 MS
Q-T INTERVAL, ECG07: 360 MS
QRS DURATION, ECG06: 74 MS
QTC CALCULATION (BEZET), ECG08: 452 MS
VENTRICULAR RATE, ECG03: 95 BPM

## 2018-04-05 NOTE — DISCHARGE INSTRUCTIONS
High Blood Pressure: Care Instructions  Your Care Instructions    If your blood pressure is usually above 140/90, you have high blood pressure, or hypertension. That means the top number is 140 or higher or the bottom number is 90 or higher, or both. Despite what a lot of people think, high blood pressure usually doesn't cause headaches or make you feel dizzy or lightheaded. It usually has no symptoms. But it does increase your risk for heart attack, stroke, and kidney or eye damage. The higher your blood pressure, the more your risk increases. Your doctor will give you a goal for your blood pressure. Your goal will be based on your health and your age. An example of a goal is to keep your blood pressure below 140/90. Lifestyle changes, such as eating healthy and being active, are always important to help lower blood pressure. You might also take medicine to reach your blood pressure goal.  Follow-up care is a key part of your treatment and safety. Be sure to make and go to all appointments, and call your doctor if you are having problems. It's also a good idea to know your test results and keep a list of the medicines you take. How can you care for yourself at home? Medical treatment  · If you stop taking your medicine, your blood pressure will go back up. You may take one or more types of medicine to lower your blood pressure. Be safe with medicines. Take your medicine exactly as prescribed. Call your doctor if you think you are having a problem with your medicine. · Talk to your doctor before you start taking aspirin every day. Aspirin can help certain people lower their risk of a heart attack or stroke. But taking aspirin isn't right for everyone, because it can cause serious bleeding. · See your doctor regularly. You may need to see the doctor more often at first or until your blood pressure comes down.   · If you are taking blood pressure medicine, talk to your doctor before you take decongestants or anti-inflammatory medicine, such as ibuprofen. Some of these medicines can raise blood pressure. · Learn how to check your blood pressure at home. Lifestyle changes  · Stay at a healthy weight. This is especially important if you put on weight around the waist. Losing even 10 pounds can help you lower your blood pressure. · If your doctor recommends it, get more exercise. Walking is a good choice. Bit by bit, increase the amount you walk every day. Try for at least 30 minutes on most days of the week. You also may want to swim, bike, or do other activities. · Avoid or limit alcohol. Talk to your doctor about whether you can drink any alcohol. · Try to limit how much sodium you eat to less than 2,300 milligrams (mg) a day. Your doctor may ask you to try to eat less than 1,500 mg a day. · Eat plenty of fruits (such as bananas and oranges), vegetables, legumes, whole grains, and low-fat dairy products. · Lower the amount of saturated fat in your diet. Saturated fat is found in animal products such as milk, cheese, and meat. Limiting these foods may help you lose weight and also lower your risk for heart disease. · Do not smoke. Smoking increases your risk for heart attack and stroke. If you need help quitting, talk to your doctor about stop-smoking programs and medicines. These can increase your chances of quitting for good. When should you call for help? Call 911 anytime you think you may need emergency care. This may mean having symptoms that suggest that your blood pressure is causing a serious heart or blood vessel problem. Your blood pressure may be over 180/110. ? For example, call 911 if:  ? · You have symptoms of a heart attack. These may include:  ¨ Chest pain or pressure, or a strange feeling in the chest.  ¨ Sweating. ¨ Shortness of breath. ¨ Nausea or vomiting.   ¨ Pain, pressure, or a strange feeling in the back, neck, jaw, or upper belly or in one or both shoulders or arms.  ¨ Lightheadedness or sudden weakness. ¨ A fast or irregular heartbeat. ? · You have symptoms of a stroke. These may include:  ¨ Sudden numbness, tingling, weakness, or loss of movement in your face, arm, or leg, especially on only one side of your body. ¨ Sudden vision changes. ¨ Sudden trouble speaking. ¨ Sudden confusion or trouble understanding simple statements. ¨ Sudden problems with walking or balance. ¨ A sudden, severe headache that is different from past headaches. ? · You have severe back or belly pain. ?Do not wait until your blood pressure comes down on its own. Get help right away. ?Call your doctor now or seek immediate care if:  ? · Your blood pressure is much higher than normal (such as 180/110 or higher), but you don't have symptoms. ? · You think high blood pressure is causing symptoms, such as:  ¨ Severe headache. ¨ Blurry vision. ? Watch closely for changes in your health, and be sure to contact your doctor if:  ? · Your blood pressure measures 140/90 or higher at least 2 times. That means the top number is 140 or higher or the bottom number is 90 or higher, or both. ? · You think you may be having side effects from your blood pressure medicine. ? · Your blood pressure is usually normal, but it goes above normal at least 2 times. Where can you learn more? Go to http://curtis-sherrie.info/. Enter K012 in the search box to learn more about \"High Blood Pressure: Care Instructions. \"  Current as of: September 21, 2016  Content Version: 11.4  © 7215-1794 [employer]. Care instructions adapted under license by [employer] (which disclaims liability or warranty for this information). If you have questions about a medical condition or this instruction, always ask your healthcare professional. [place] [address] any warranty or liability for your use of this information.          Leg and Ankle Edema: Care Instructions  Your Care Instructions  Swelling in the legs, ankles, and feet is called edema. It is common after you sit or stand for a while. Long plane flights or car rides often cause swelling in the legs and feet. You may also have swelling if you have to stand for long periods of time at your job. Problems with the veins in the legs (varicose veins) and changes in hormones can also cause swelling. Sometimes the swelling in the ankles and feet is caused by a more serious problem, such as heart failure, infection, blood clots, or liver or kidney disease. Follow-up care is a key part of your treatment and safety. Be sure to make and go to all appointments, and call your doctor if you are having problems. It's also a good idea to know your test results and keep a list of the medicines you take. How can you care for yourself at home? · If your doctor gave you medicine, take it as prescribed. Call your doctor if you think you are having a problem with your medicine. · Whenever you are resting, raise your legs up. Try to keep the swollen area higher than the level of your heart. · Take breaks from standing or sitting in one position. ¨ Walk around to increase the blood flow in your lower legs. ¨ Move your feet and ankles often while you stand, or tighten and relax your leg muscles. · Wear support stockings. Put them on in the morning, before swelling gets worse. · Eat a balanced diet. Lose weight if you need to. · Limit the amount of salt (sodium) in your diet. Salt holds fluid in the body and may increase swelling. When should you call for help? Call 911 anytime you think you may need emergency care. For example, call if:  ? · You have symptoms of a blood clot in your lung (called a pulmonary embolism). These may include:  ¨ Sudden chest pain. ¨ Trouble breathing. ¨ Coughing up blood.    ?Call your doctor now or seek immediate medical care if:  ? · You have signs of a blood clot, such as:  ¨ Pain in your calf, back of the knee, thigh, or groin. ¨ Redness and swelling in your leg or groin. ? · You have symptoms of infection, such as:  ¨ Increased pain, swelling, warmth, or redness. ¨ Red streaks or pus. ¨ A fever. ? Watch closely for changes in your health, and be sure to contact your doctor if:  ? · Your swelling is getting worse. ? · You have new or worsening pain in your legs. ? · You do not get better as expected. Where can you learn more? Go to http://curtis-sherrie.info/. Enter Y370 in the search box to learn more about \"Leg and Ankle Edema: Care Instructions. \"  Current as of: March 20, 2017  Content Version: 11.4  © 5150-7487 [employer]. Care instructions adapted under license by [employer] (which disclaims liability or warranty for this information). If you have questions about a medical condition or this instruction, always ask your healthcare professional. [place] [address] any warranty or liability for your use of this information. Shortness of Breath: Care Instructions  Your Care Instructions  Shortness of breath has many causes. Sometimes conditions such as anxiety can lead to shortness of breath. Some people get mild shortness of breath when they exercise. Trouble breathing also can be a symptom of a serious problem, such as asthma, lung disease, emphysema, heart problems, and pneumonia. If your shortness of breath continues, you may need tests and treatment. Watch for any changes in your breathing and other symptoms. Follow-up care is a key part of your treatment and safety. Be sure to make and go to all appointments, and call your doctor if you are having problems. It's also a good idea to know your test results and keep a list of the medicines you take. How can you care for yourself at home? · Do not smoke or allow others to smoke around you.  If you need help quitting, talk to your doctor about stop-smoking programs and medicines. These can increase your chances of quitting for good. · Get plenty of rest and sleep. · Take your medicines exactly as prescribed. Call your doctor if you think you are having a problem with your medicine. · Find healthy ways to deal with stress. ¨ Exercise daily. ¨ Get plenty of sleep. ¨ Eat regularly and well. When should you call for help? Call 911 anytime you think you may need emergency care. For example, call if:  ? · You have severe shortness of breath. ? · You have symptoms of a heart attack. These may include:  ¨ Chest pain or pressure, or a strange feeling in the chest.  ¨ Sweating. ¨ Shortness of breath. ¨ Nausea or vomiting. ¨ Pain, pressure, or a strange feeling in the back, neck, jaw, or upper belly or in one or both shoulders or arms. ¨ Lightheadedness or sudden weakness. ¨ A fast or irregular heartbeat. After you call 911, the  may tell you to chew 1 adult-strength or 2 to 4 low-dose aspirin. Wait for an ambulance. Do not try to drive yourself. ?Call your doctor now or seek immediate medical care if:  ? · Your shortness of breath gets worse or you start to wheeze. Wheezing is a high-pitched sound when you breathe. ? · You wake up at night out of breath or have to prop your head up on several pillows to breathe. ? · You are short of breath after only light activity or while at rest.   ? Watch closely for changes in your health, and be sure to contact your doctor if:  ? · You do not get better over the next 1 to 2 days. Where can you learn more? Go to http://curtis-sherrie.info/. Enter S780 in the search box to learn more about \"Shortness of Breath: Care Instructions. \"  Current as of: May 12, 2017  Content Version: 11.4  © 8239-9330 [employer] [facility]       Shortness of Breath: Care Instructions  Your Care Instructions  Shortness of breath has many causes. Sometimes conditions such as anxiety can lead to shortness of breath.  Some people get mild shortness of breath when they exercise. Trouble breathing also can be a symptom of a serious problem, such as asthma, lung disease, emphysema, heart problems, and pneumonia. If your shortness of breath continues, you may need tests and treatment. Watch for any changes in your breathing and other symptoms. Follow-up care is a key part of your treatment and safety. Be sure to make and go to all appointments, and call your doctor if you are having problems. It's also a good idea to know your test results and keep a list of the medicines you take. How can you care for yourself at home? · Do not smoke or allow others to smoke around you. If you need help quitting, talk to your doctor about stop-smoking programs and medicines. These can increase your chances of quitting for good. · Get plenty of rest and sleep. · Take your medicines exactly as prescribed. Call your doctor if you think you are having a problem with your medicine. · Find healthy ways to deal with stress. ¨ Exercise daily. ¨ Get plenty of sleep. ¨ Eat regularly and well. When should you call for help? Call 911 anytime you think you may need emergency care. For example, call if:  ? · You have severe shortness of breath. ? · You have symptoms of a heart attack. These may include:  ¨ Chest pain or pressure, or a strange feeling in the chest.  ¨ Sweating. ¨ Shortness of breath. ¨ Nausea or vomiting. ¨ Pain, pressure, or a strange feeling in the back, neck, jaw, or upper belly or in one or both shoulders or arms. ¨ Lightheadedness or sudden weakness. ¨ A fast or irregular heartbeat. After you call 911, the  may tell you to chew 1 adult-strength or 2 to 4 low-dose aspirin. Wait for an ambulance. Do not try to drive yourself. ?Call your doctor now or seek immediate medical care if:  ? · Your shortness of breath gets worse or you start to wheeze. Wheezing is a high-pitched sound when you breathe.    ? · You wake up at night out of breath or have to prop your head up on several pillows to breathe. ? · You are short of breath after only light activity or while at rest.   ? Watch closely for changes in your health, and be sure to contact your doctor if:  ? · You do not get better over the next 1 to 2 days. Where can you learn more? Go to http://curtis-sherrie.info/. Enter S780 in the search box to learn more about \"Shortness of Breath: Care Instructions. \"  Current as of: May 12, 2017  Content Version: 11.4  © 8378-7394 [employer]. Care instructions adapted under license by [employer] (which disclaims liability or warranty for this information). If you have questions about a medical condition or this instruction, always ask your healthcare professional. [email or web address] [address] any warranty or liability for your use of this information. rated. Care instructions adapted under license by [employer] (which disclaims liability or warranty for this information). If you have questions about a medical condition or this instruction, always ask your healthcare professional. [email or web address] any warranty or liability for your use of this information. Droid system master Activation    Thank you for requesting access to Droid system master. Please follow the instructions below to securely access and download your online medical record. Droid system master allows you to send messages to your doctor, view your test results, renew your prescriptions, schedule appointments, and more. How Do I Sign Up? 1. In your internet browser, go to [email or web address]  2. Click on the First Time User? Click Here link in the Sign In box. You will be redirect to the New Member Sign Up page. 3. Enter your Droid system master Access Code exactly as it appears below. You will not need to use this code after youve completed the sign-up process.  If you do not sign up before the expiration date, you must request a new code.    Sumerian Access Code: Activation code not generated  Current Sumerian Status: Active (This is the date your irisnotet access code will )    4. Enter the last four digits of your Social Security Number (xxxx) and Date of Birth (mm/dd/yyyy) as indicated and click Submit. You will be taken to the next sign-up page. 5. Create a irisnotet ID. This will be your Sumerian login ID and cannot be changed, so think of one that is secure and easy to remember. 6. Create a Sumerian password. You can change your password at any time. 7. Enter your Password Reset Question and Answer. This can be used at a later time if you forget your password. 8. Enter your e-mail address. You will receive e-mail notification when new information is available in [address] 9. Click Sign Up. You can now view and download portions of your medical record. 10. Click the Download Summary menu link to download a portable copy of your medical information. Additional Information    If you have questions, please visit the Frequently Asked Questions section of the Sumerian website at [email or web address] "[employer]. [email or web address] Remember, Sumerian is NOT to be used for urgent needs. For medical emergencies, dial 911.

## 2018-04-05 NOTE — ED PROVIDER NOTES
EMERGENCY DEPARTMENT HISTORY AND PHYSICAL EXAM    9:12 PM      Date: 4/4/2018  Patient Name: Mary Talbot    History of Presenting Illness     Chief Complaint   Patient presents with    Shortness of Breath    Leg Swelling         History Provided By: Patient    Chief Complaint: dyspnea, leg edema  Duration:  Days  Timing:  Acute  Location: b/l leg  Quality: n/a  Severity: Moderate  Modifying Factors: worse with exertion  Associated Symptoms: denies any other associated signs or symptoms      Additional History (Context): Mary Talbot is a 41 y.o. female with arthritis, HTN who presents with c/o weight- gain of 20lbs x 1 month, leg edema b/l x 1 week, and dyspnea on exertion x 1 day. Denies fever/chills, chest pain, abdominal pain, n/v, pleuritic symptoms, diaphoresis. Denies hx of cardiac disease, hx of CHF. Denies hx of DVT/PE, hemoptysis, recent surgery/travel, OCP/estrogen replacement therapy. LMP: 3/29/18, hx of tubal ligation. Does not smoke. Pt also notes \"lung issues\" x years. Has had PFTs in the past. No specific diagnosis. PCP: Linnea Nicholson MD    Current Facility-Administered Medications   Medication Dose Route Frequency Provider Last Rate Last Dose    hydroCHLOROthiazide (HYDRODIURIL) tablet 12.5 mg  12.5 mg Oral NOW Miriam Navarro         Current Outpatient Prescriptions   Medication Sig Dispense Refill    hydroCHLOROthiazide (HYDRODIURIL) 12.5 mg tablet Take 1 Tab by mouth daily for 20 days. 20 Tab 0    albuterol (PROVENTIL HFA, VENTOLIN HFA, PROAIR HFA) 90 mcg/actuation inhaler Take 2 Puffs by inhalation every four (4) hours as needed for Wheezing or Shortness of Breath. Indications: BRONCHOSPASM PREVENTION 1 Inhaler 0    raNITIdine (ZANTAC 75) 75 mg tablet Take 75 mg by mouth two (2) times a day.  omeprazole (PRILOSEC) 40 mg capsule Take 40 mg by mouth daily.  Biotin 2,500 mcg cap Take  by mouth.          Past History     Past Medical History:  Past Medical History: Diagnosis Date    Anemia NEC     Arthritis     Bell's palsy     Dyspepsia and other specified disorders of function of stomach     Endocrine disease     anemic    Kidney stone        Past Surgical History:  Past Surgical History:   Procedure Laterality Date    ABDOMEN SURGERY PROC UNLISTED  12/24/2012    cholecystectomy    HX CHOLECYSTECTOMY  2012    HX GYN  1992    ceserean    HX GYN  2001    tubal    HX OTHER SURGICAL      tubal ligation    [place] ER[place] REMOVE CALCULI/DEBRIS BILIARY/PANCREAS DUCT  12/26/2012         [place] W/SPHINCTEROTOMY/PAPILLOTOMY  12/26/2012            Family History:  Family History   Problem Relation Age of Onset    Hypertension Mother     Stroke Father     Hypertension Father     Heart Disease Father     Cancer Father      leukemia    Heart Attack Other      grandfather - 76s    Heart Disease Daughter        Social History:  Social History   Substance Use Topics    Smoking status: Never Smoker    Smokeless tobacco: Never Used    Alcohol use No       Allergies: Allergies   Allergen Reactions    Codeine Hives    Lactose Diarrhea         Review of Systems       Review of Systems   Constitutional: Negative for chills and fever. Respiratory: Positive for shortness of breath. Cardiovascular: Positive for leg swelling. Negative for chest pain and palpitations. Gastrointestinal: Negative for abdominal pain, nausea and vomiting. Skin: Negative for rash. Neurological: Negative for weakness. All other systems reviewed and are negative. Physical Exam     Visit Vitals    BP (!) 167/94 (BP 1 Location: Right arm, BP Patient Position: At rest;Sitting)    Pulse 96    Temp 98 °F (36.7 °C)    Resp 16    Ht 5' 1\" (1.549 m)    Wt 117.9 kg (260 lb)    SpO2 100%    BMI 49.13 kg/m2         Physical Exam   Constitutional: She appears well-developed and well-nourished. No distress. HENT:   Head: Normocephalic and atraumatic. Neck: Normal range of motion.  Neck supple. Cardiovascular: Normal rate, regular rhythm, normal heart sounds and intact distal pulses. Exam reveals no gallop and no friction rub. No murmur heard. Pulmonary/Chest: Effort normal and breath sounds normal. No accessory muscle usage. No tachypnea. No respiratory distress. She has no decreased breath sounds. She has no wheezes. She has no rhonchi. She has no rales. Abdominal: Soft. Bowel sounds are normal. She exhibits no distension and no mass. There is no tenderness. There is no rebound and no guarding. Musculoskeletal: She exhibits edema (1+ pitting edema b/l, no erythema or discoloration, no calf pain/ tenderness  ). 2+ dorsalis pedis b/l    Neurological: She is alert. Skin: Skin is warm. No rash noted. She is not diaphoretic. Nursing note and vitals reviewed. Diagnostic Study Results     Labs -  Recent Results (from the past 12 hour(s))   EKG, 12 LEAD, INITIAL    Collection Time: 04/04/18  7:48 PM   Result Value Ref Range    Ventricular Rate 95 BPM    Atrial Rate 95 BPM    P-R Interval 168 ms    QRS Duration 74 ms    Q-T Interval 360 ms    QTC Calculation (Bezet) 452 ms    Calculated P Axis 43 degrees    Calculated R Axis 21 degrees    Calculated T Axis 24 degrees    Diagnosis       Normal sinus rhythm  Normal ECG  When compared with ECG of 22-AUG-2017 16:20,  No significant change was found     CBC WITH AUTOMATED DIFF    Collection Time: 04/04/18  8:40 PM   Result Value Ref Range    WBC 8.8 4.6 - 13.2 K/uL    RBC 4.89 4.20 - 5.30 M/uL    HGB 11.9 (L) 12.0 - 16.0 g/dL    HCT 37.6 35.0 - 45.0 %    MCV 76.9 74.0 - 97.0 FL    MCH 24.3 24.0 - 34.0 PG    MCHC 31.6 31.0 - 37.0 g/dL    RDW 16.4 (H) 11.6 - 14.5 %    PLATELET 927 717 - 392 K/uL    MPV 9.4 9.2 - 11.8 FL    NEUTROPHILS 69 40 - 73 %    LYMPHOCYTES 23 21 - 52 %    MONOCYTES 7 3 - 10 %    EOSINOPHILS 1 0 - 5 %    BASOPHILS 0 0 - 2 %    ABS. NEUTROPHILS 6.0 1.8 - 8.0 K/UL    ABS. LYMPHOCYTES 2.0 0.9 - 3.6 K/UL    ABS. MONOCYTES 0.6 0.05 - 1.2 K/UL    ABS. EOSINOPHILS 0.1 0.0 - 0.4 K/UL    ABS. BASOPHILS 0.0 0.0 - 0.06 K/UL    DF AUTOMATED     METABOLIC PANEL, COMPREHENSIVE    Collection Time: 04/04/18  8:40 PM   Result Value Ref Range    Sodium 137 136 - 145 mmol/L    Potassium 5.2 3.5 - 5.5 mmol/L    Chloride 104 100 - 108 mmol/L    CO2 26 21 - 32 mmol/L    Anion gap 7 3.0 - 18 mmol/L    Glucose 132 (H) 74 - 99 mg/dL    BUN 16 7.0 - 18 MG/DL    Creatinine 0.67 0.6 - 1.3 MG/DL    BUN/Creatinine ratio 24 (H) 12 - 20      GFR est AA >60 >60 ml/min/1.73m2    GFR est non-AA >60 >60 ml/min/1.73m2    Calcium 8.7 8.5 - 10.1 MG/DL    Bilirubin, total 0.6 0.2 - 1.0 MG/DL    ALT (SGPT) 27 13 - 56 U/L    AST (SGOT) 40 (H) 15 - 37 U/L    Alk. phosphatase 124 (H) 45 - 117 U/L    Protein, total 7.4 6.4 - 8.2 g/dL    Albumin 3.2 (L) 3.4 - 5.0 g/dL    Globulin 4.2 (H) 2.0 - 4.0 g/dL    A-G Ratio 0.8 0.8 - 1.7     CARDIAC PANEL,(CK, CKMB & TROPONIN)    Collection Time: 04/04/18  8:40 PM   Result Value Ref Range    CK 90 26 - 192 U/L    CK - MB <1.0 <3.6 ng/ml    CK-MB Index  0.0 - 4.0 %     CALCULATION NOT PERFORMED WHEN RESULT IS BELOW LINEAR LIMIT    Troponin-I, Qt. <0.02 0.0 - 0.045 NG/ML   NT-PRO BNP    Collection Time: 04/04/18  8:40 PM   Result Value Ref Range    NT pro- 0 - 450 PG/ML   TSH 3RD GENERATION    Collection Time: 04/04/18  8:40 PM   Result Value Ref Range    TSH 1.85 0.36 - 3.74 uIU/mL   HCG QL SERUM    Collection Time: 04/04/18  8:40 PM   Result Value Ref Range    HCG, Ql. NEGATIVE  NEG         Radiologic Studies -   XR CHEST PA LAT    (Results Pending)   RADIOLOGY FINDINGS  Chest  X-ray shows no acute process   Pending review by Radiologist  Recorded by Harsh Hall PA-C        Medical Decision Making   I am the first provider for this patient. I reviewed the vital signs, available nursing notes, past medical history, past surgical history, family history and social history.     Vital Signs-Reviewed the patient's vital signs. Pulse Oximetry Analysis -  100 on room air (Interpretation)    Cardiac Monitor:  Rate: 88  Rhythm:  Normal sinus rhythm     EKG: Interpreted by the EP. Time Interpreted: 9:15 PM   Rate: 95   Rhythm: sinus rhythm, no STEMI    Records Reviewed: Nursing Notes and Old Medical Records (Time of Review: 9:12 PM)    ED Course: Progress Notes, Reevaluation, and Consults:  9:15 PM: Pt notes she has been off of her blood pressure medication x months. Needs help with follow-up. Reviewed weight from 11/26/18: 108.9 kilo, today 117.9 kilo  9:56 PM: Reviewed results with patient and family member. Pt in no distress, talking comfortably. Discussed need for close follow-up with PMD as outpatient. Placed  consult for help with insurance and PMD follow-up. Discussed need for elevation and leg compression. Discussed strict return precautions for any new, worsening, or persistent symptoms, including chest pain. Provider Notes (Medical Decision Making): 40 yo F who presents due to dyspnea on exertion and b/l leg edema. Afebrile, 100% oxygenation on room air. Lungs CTAB without crackles or wheezing. 2+ pitting edema b/l, no signs of cellulitis, no calf pain/tenderness. BNP WNL, CXR without acute process. EKG without signs of ischemia, troponin negative. PERC/WELLS negative. Discussed importance of close outpatient follow-up with PMD.     Diagnosis     Clinical Impression:   1. Dyspnea, unspecified type    2. Bilateral leg edema    3. Essential hypertension    4.  Hyperglycemia        Disposition: home    Follow-up Information     Follow up With Details Comments Contact Info    [facility] EMERGENCY DEPT  If symptoms worsen [address] [place] [address] [address] Schedule an appointment as soon as possible for a visit  800 [facility] [id number]  [address] [place] [address] ([address]) [address] [facility] Call  [address] [place] [address]  [phone]           Patient's Medications   Start Taking    HYDROCHLOROTHIAZIDE (HYDRODIURIL) 12.5 MG TABLET    Take 1 Tab by mouth daily for 20 days. Continue Taking    ALBUTEROL (PROVENTIL HFA, VENTOLIN HFA, PROAIR HFA) 90 MCG/ACTUATION INHALER    Take 2 Puffs by inhalation every four (4) hours as needed for Wheezing or Shortness of Breath. Indications: BRONCHOSPASM PREVENTION    BIOTIN 2,500 MCG CAP    Take  by mouth. OMEPRAZOLE (PRILOSEC) 40 MG CAPSULE    Take 40 mg by mouth daily. RANITIDINE (ZANTAC 75) 75 MG TABLET    Take 75 mg by mouth two (2) times a day. These Medications have changed    No medications on file   Stop Taking    HYDROCODONE-ACETAMINOPHEN (NORCO) 5-325 MG PER TABLET    Take 1-2 Tabs by mouth every six (6) hours as needed for Pain. Max Daily Amount: 8 Tabs. HYDROCODONE-ACETAMINOPHEN (NORCO) 5-325 MG PER TABLET    Take 1 Tab by mouth every four (4) hours as needed for Pain. Max Daily Amount: 6 Tabs. IBUPROFEN (MOTRIN) 600 MG TABLET    Take 1 Tab by mouth every six (6) hours as needed for Pain. ONDANSETRON (ZOFRAN ODT) 4 MG DISINTEGRATING TABLET    Take 1-2 Tabs by mouth every eight (8) hours as needed for Nausea.

## 2018-11-03 ENCOUNTER — HOSPITAL ENCOUNTER (EMERGENCY)
Age: 48
Discharge: HOME | End: 2018-11-04
Attending: EMERGENCY MEDICINE
Payer: SELF-PAY

## 2018-11-03 DIAGNOSIS — J20.9 ACUTE BRONCHITIS, UNSPECIFIED ORGANISM: Primary | ICD-10-CM

## 2018-11-03 PROCEDURE — 99282 EMERGENCY DEPT VISIT SF MDM: CPT

## 2018-11-03 RX ORDER — HYDRALAZINE HYDROCHLORIDE 25 MG/1
25 TABLET, FILM COATED ORAL 3 TIMES DAILY
COMMUNITY
End: 2020-01-02

## 2018-11-04 ENCOUNTER — APPOINTMENT (OUTPATIENT)
Dept: GENERAL RADIOLOGY | Age: 48
End: 2018-11-04
Attending: PHYSICIAN ASSISTANT
Payer: SELF-PAY

## 2018-11-04 VITALS
SYSTOLIC BLOOD PRESSURE: 125 MMHG | TEMPERATURE: 98.2 F | DIASTOLIC BLOOD PRESSURE: 89 MMHG | HEART RATE: 89 BPM | OXYGEN SATURATION: 100 % | RESPIRATION RATE: 20 BRPM

## 2018-11-04 PROCEDURE — 71046 X-RAY EXAM CHEST 2 VIEWS: CPT

## 2018-11-04 RX ORDER — AZITHROMYCIN 250 MG/1
TABLET, FILM COATED ORAL
Qty: 6 TAB | Refills: 0 | Status: SHIPPED | OUTPATIENT
Start: 2018-11-04 | End: 2018-11-09

## 2018-11-04 RX ORDER — PREDNISONE 20 MG/1
20 TABLET ORAL DAILY
Qty: 7 TAB | Refills: 0 | Status: SHIPPED | OUTPATIENT
Start: 2018-11-04 | End: 2018-11-11

## 2018-11-04 RX ORDER — HYDROCODONE POLISTIREX AND CHLORPHENIRAMINE POLISTIREX 10; 8 MG/5ML; MG/5ML
5 SUSPENSION, EXTENDED RELEASE ORAL
Qty: 60 ML | Refills: 0 | Status: SHIPPED | OUTPATIENT
Start: 2018-11-04 | End: 2020-01-02

## 2018-11-04 NOTE — ED TRIAGE NOTES
Patient comes in complaining of cough for about a month and back pain that has been going on longer than that. Patient states that she is tired of it now.

## 2018-11-04 NOTE — ED PROVIDER NOTES
EMERGENCY DEPARTMENT HISTORY AND PHYSICAL EXAM 
 
Date: 11/3/2018 Patient Name: Iqra Saucedo History of Presenting Illness Chief Complaint Patient presents with  Cough  Back Pain History Provided By: Patient Chief Complaint: cough Duration: 1 Months Timing:  Gradual and Progressive Location: chest 
Quality: n/a Severity: Moderate Modifying Factors: none Associated Symptoms: also having acute on chronic lower back pain HPI: Iqra Saucedo is a 57 y.o. female with a PMH of Anemia, Telferner Palsy, bronchitis, arthritis, chronic back pain, GERD who presents to the ER c/o cough and chest congestion. Patient states she has had a persistent cough x 1 month. She has tried multiple OTC meds with minimal relief. She reports some production of sputum, unsure of the color. She also reports some associated lower back pain, which has worsened with the coughing. She denied any recent fevers, chills, SOB, chest pain, palpitations, numbness, tingling, bowel or bladder incontinence, saddle anesthesia and has no other symptoms or complaints. PCP: Mendoza Araya MD 
 
Current Outpatient Medications Medication Sig Dispense Refill  azithromycin (ZITHROMAX Z-JACK) 250 mg tablet Take 2 tabs on day 1, then 1 tab daily for total of 5 days 6 Tab 0  chlorpheniramine-HYDROcodone (TUSSIONEX PENNKINETIC ER) 10-8 mg/5 mL suspension Take 5 mL by mouth every twelve (12) hours as needed for Cough. Max Daily Amount: 10 mL. 60 mL 0  
 predniSONE (DELTASONE) 20 mg tablet Take 1 Tab by mouth daily for 7 days. With Breakfast 7 Tab 0  
 hydrALAZINE (APRESOLINE) 25 mg tablet Take 25 mg by mouth three (3) times daily.  raNITIdine (ZANTAC 75) 75 mg tablet Take 75 mg by mouth two (2) times a day.  omeprazole (PRILOSEC) 40 mg capsule Take 40 mg by mouth daily.  Biotin 2,500 mcg cap Take  by mouth. Past History Past Medical History: 
Past Medical History:  
Diagnosis Date  Anemia NEC  Arthritis  Bell's palsy  Dyspepsia and other specified disorders of function of stomach  Endocrine disease   
 anemic  Kidney stone Past Surgical History: 
Past Surgical History:  
Procedure Laterality Date  ABDOMEN SURGERY PROC UNLISTED  12/24/2012  
 cholecystectomy  HX CHOLECYSTECTOMY  2012 [address]  HX GYN  2001  
 tubal  
 HX OTHER SURGICAL    
 tubal ligation  [place] ERCP REMOVE CALCULI/DEBRIS BILIARY/PANCREAS DUCT  12/26/2012  [place] ER[place] W/SPHINCTEROTOMY/PAPILLOTOMY  12/26/2012 Family History: 
Family History Problem Relation Age of Onset  Hypertension Mother  Stroke Father  Hypertension Father  Heart Disease Father  Cancer Father   
     leukemia  Heart Attack Other   
     grandfather - 76s  Heart Disease Daughter Social History: 
Social History Tobacco Use  Smoking status: Never Smoker  Smokeless tobacco: Never Used Substance Use Topics  Alcohol use: No  
 Drug use: No  
 
 
Allergies: Allergies Allergen Reactions  Codeine Hives  Lactose Diarrhea Review of Systems Review of Systems Constitutional: Negative for chills, fatigue and fever. HENT: Negative. Negative for sore throat. Eyes: Negative. Respiratory: Positive for cough. Negative for shortness of breath. Cardiovascular: Negative for chest pain and palpitations. Gastrointestinal: Negative for abdominal pain, nausea and vomiting. Genitourinary: Negative for dysuria. Musculoskeletal: Positive for back pain. Skin: Negative. Neurological: Negative for dizziness, weakness, light-headedness and headaches. Psychiatric/Behavioral: Negative. All other systems reviewed and are negative. Physical Exam  
 
Vitals:  
 11/03/18 2330 BP: (!) 159/93 Pulse: 91  
Resp: 20 Temp: 98.2 °F (36.8 °C) SpO2: 99% Physical Exam  
 Constitutional: She is oriented to person, place, and time. She appears well-developed and well-nourished. No distress. HENT:  
Head: Normocephalic and atraumatic. Right Ear: Tympanic membrane, external ear and ear canal normal.  
Left Ear: Tympanic membrane, external ear and ear canal normal.  
Nose: Nose normal.  
Mouth/Throat: Uvula is midline and mucous membranes are normal. Posterior oropharyngeal erythema present. Eyes: Conjunctivae are normal. No scleral icterus. Neck: Neck supple. No JVD present. No tracheal deviation present. Cardiovascular: Normal rate, regular rhythm and normal heart sounds. No murmur heard. Pulmonary/Chest: Effort normal and breath sounds normal. No respiratory distress. She has no wheezes. She has no rales. She exhibits no tenderness. Speaks in full sentences Abdominal: Soft. There is no tenderness. Musculoskeletal: Normal range of motion. Neurological: She is alert and oriented to person, place, and time. She has normal strength. Gait normal. GCS eye subscore is 4. GCS verbal subscore is 5. GCS motor subscore is 6. Skin: Skin is warm and dry. She is not diaphoretic. Psychiatric: She has a normal mood and affect. Nursing note and vitals reviewed. Diagnostic Study Results Labs - No results found for this or any previous visit (from the past 12 hour(s)). Radiologic Studies -  
XR CHEST PA LAT    (Results Pending) CT Results  (Last 48 hours) None CXR Results  (Last 48 hours) None Medical Decision Making I am the first provider for this patient. I reviewed the vital signs, available nursing notes, past medical history, past surgical history, family history and social history. Vital Signs-Reviewed the patient's vital signs. Records Reviewed: Nursing Notes, Old Medical Records, Previous Radiology Studies and Previous Laboratory Studies  12:25 AM 
 56 y/o female c/o persistent cough and chest congestion x 1 month. Reports hx of bronchitis in the past, states this feels similar. Also c/o acute on chronic lower back pain. Reports she has had problems for years. Worsening over past week. Taking OTC meds with minimal relief. Speaks in full sentences, afebrile and well appearing on exam.  Will plan on cxr to r/o infiltrate. Ricardo Sadler PA-C 
  
12:57 AM 
No acute process noted on cxr. Discussed results with pt. Will plan on meds for symptom management. Pt states no adverse reaction to tussionex, although listed allergy to codeine. Given return precautions. All questions answered and patient in agreement with plan of care. Will plan for discharge. Ricardo Sadler PA-C Disposition: 
Discharged DISCHARGE NOTE:  
 
  Care plan outlined and precautions discussed. Patient has no new complaints, changes, or physical findings. Results of cxr were reviewed with the patient. All medications were reviewed with the patient; will d/c home with prednisone, z-pack, tussionex. All of pt's questions and concerns were addressed. Patient was instructed and agrees to follow up with pcp, as well as to return to the ED upon further deterioration. Patient is ready to go home. Follow-up Information Follow up With Specialties Details Why Contact [facility] EMERGENCY DEPT Emergency Medicine  If symptoms worsen [address] 
[phone] Cathy Stevens MD Family Practice Call in 3 days As needed for ER follow up [phone] 
[phone] Current Discharge Medication List  
  
START taking these medications Details  
azithromycin (ZITHROMAX Z-JACK) 250 mg tablet Take 2 tabs on day 1, then 1 tab daily for total of 5 days Qty: 6 Tab, Refills: 0  
  
chlorpheniramine-HYDROcodone (TUSSIONEX PENNKINETIC ER) 10-8 mg/5 mL suspension Take 5 mL by mouth every twelve (12) hours as needed for Cough. Max Daily Amount: 10 mL. Qty: 60 mL, Refills: 0 Associated Diagnoses: Acute bronchitis, unspecified organism  
  
predniSONE (DELTASONE) 20 mg tablet Take 1 Tab by mouth daily for 7 days. With Breakfast 
Qty: 7 Tab, Refills: 0 CONTINUE these medications which have NOT CHANGED Details  
hydrALAZINE (APRESOLINE) 25 mg tablet Take 25 mg by mouth three (3) times daily. raNITIdine (ZANTAC 75) 75 mg tablet Take 75 mg by mouth two (2) times a day. omeprazole (PRILOSEC) 40 mg capsule Take 40 mg by mouth daily. Biotin 2,500 mcg cap Take  by mouth. Provider Notes (Medical Decision Making):  
 
Procedures: 
Procedures Diagnosis Clinical Impression: 1. Acute bronchitis, unspecified organism

## 2018-11-04 NOTE — DISCHARGE INSTRUCTIONS
Bronchitis: Care Instructions  Your Care Instructions    Bronchitis is inflammation of the bronchial tubes, which carry air to the lungs. The tubes swell and produce mucus, or phlegm. The mucus and inflamed bronchial tubes make you cough. You may have trouble breathing. Most cases of bronchitis are caused by viruses like those that cause colds. Antibiotics usually do not help and they may be harmful. Bronchitis usually develops rapidly and lasts about 2 to 3 weeks in otherwise healthy people. Follow-up care is a key part of your treatment and safety. Be sure to make and go to all appointments, and call your doctor if you are having problems. It's also a good idea to know your test results and keep a list of the medicines you take. How can you care for yourself at home? · Take all medicines exactly as prescribed. Call your doctor if you think you are having a problem with your medicine. · Get some extra rest.  · Take an over-the-counter pain medicine, such as acetaminophen (Tylenol), ibuprofen (Advil, Motrin), or naproxen (Aleve) to reduce fever and relieve body aches. Read and follow all instructions on the label. · Do not take two or more pain medicines at the same time unless the doctor told you to. Many pain medicines have acetaminophen, which is Tylenol. Too much acetaminophen (Tylenol) can be harmful. · Take an over-the-counter cough medicine that contains dextromethorphan to help quiet a dry, hacking cough so that you can sleep. Avoid cough medicines that have more than one active ingredient. Read and follow all instructions on the label. · Breathe moist air from a humidifier, hot shower, or sink filled with hot water. The heat and moisture will thin mucus so you can cough it out. · Do not smoke. Smoking can make bronchitis worse. If you need help quitting, talk to your doctor about stop-smoking programs and medicines. These can increase your chances of quitting for good.   When should you call for help? Call 911 anytime you think you may need emergency care. For example, call if:    · You have severe trouble breathing.    Call your doctor now or seek immediate medical care if:    · You have new or worse trouble breathing.     · You cough up dark brown or bloody mucus (sputum).     · You have a new or higher fever.     · You have a new rash.    Watch closely for changes in your health, and be sure to contact your doctor if:    · You cough more deeply or more often, especially if you notice more mucus or a change in the color of your mucus.     · You are not getting better as expected. Where can you learn more? Go to http://curtis-sherrie.info/. Enter H333 in the search box to learn more about \"Bronchitis: Care Instructions. \"  Current as of: December 6, 2017  Content Version: 11.8  © 2862-8933 [employer]. Care instructions adapted under license by [employer] (which disclaims liability or warranty for this information). If you have questions about a medical condition or this instruction, always ask your healthcare professional. [address] any warranty or liability for your use of this information.

## 2018-11-04 NOTE — ED NOTES
I have reviewed discharge instructions with the patient. The patient verbalized understanding. Discharge medications reviewed with patient and appropriate educational materials and side effects teaching were provided. Patient in stable condition at discharge. Patient signed paper discharge instructions.

## 2020-01-02 ENCOUNTER — HOSPITAL ENCOUNTER (EMERGENCY)
Age: 50
Discharge: HOME | End: 2020-01-02
Attending: EMERGENCY MEDICINE | Admitting: EMERGENCY MEDICINE
Payer: SELF-PAY

## 2020-01-02 ENCOUNTER — APPOINTMENT (OUTPATIENT)
Dept: GENERAL RADIOLOGY | Age: 50
End: 2020-01-02
Attending: PHYSICIAN ASSISTANT
Payer: SELF-PAY

## 2020-01-02 VITALS
HEIGHT: 60 IN | DIASTOLIC BLOOD PRESSURE: 93 MMHG | SYSTOLIC BLOOD PRESSURE: 181 MMHG | TEMPERATURE: 98.3 F | BODY MASS INDEX: 53.4 KG/M2 | RESPIRATION RATE: 17 BRPM | HEART RATE: 97 BPM | OXYGEN SATURATION: 98 % | WEIGHT: 272 LBS

## 2020-01-02 DIAGNOSIS — J18.9 COMMUNITY ACQUIRED PNEUMONIA OF RIGHT LUNG, UNSPECIFIED PART OF LUNG: Primary | ICD-10-CM

## 2020-01-02 PROCEDURE — 71046 X-RAY EXAM CHEST 2 VIEWS: CPT

## 2020-01-02 PROCEDURE — 99283 EMERGENCY DEPT VISIT LOW MDM: CPT

## 2020-01-02 RX ORDER — CHOLECALCIFEROL TAB 125 MCG (5000 UNIT) 125 MCG
5000 TAB ORAL
COMMUNITY

## 2020-01-02 RX ORDER — DOXYCYCLINE HYCLATE 100 MG
100 TABLET ORAL 2 TIMES DAILY
Qty: 14 TAB | Refills: 0 | Status: SHIPPED | OUTPATIENT
Start: 2020-01-02 | End: 2020-01-02

## 2020-01-02 RX ORDER — LANOLIN ALCOHOL/MO/W.PET/CERES
1000 CREAM (GRAM) TOPICAL
COMMUNITY

## 2020-01-02 RX ORDER — DOXYCYCLINE HYCLATE 100 MG
100 TABLET ORAL 2 TIMES DAILY
Qty: 14 TAB | Refills: 0 | Status: SHIPPED | OUTPATIENT
Start: 2020-01-02 | End: 2020-01-09

## 2020-01-02 RX ORDER — HYDRALAZINE HYDROCHLORIDE 25 MG/1
25 TABLET, FILM COATED ORAL 3 TIMES DAILY
Qty: 15 TAB | Refills: 0 | Status: SHIPPED | OUTPATIENT
Start: 2020-01-02

## 2020-01-03 NOTE — ED PROVIDER NOTES
EMERGENCY DEPARTMENT HISTORY AND PHYSICAL EXAM    9:25 PM      Date: 1/2/2020  Patient Name: Edgard Hi    History of Presenting Illness     Chief Complaint   Patient presents with    Cough    Ear Pain         History Provided By: Patient    Additional History (Context): Edgard Hi is a 54 y.o. female with No significant past medical history who presents with complaints of cough which is been present for almost 1 month. Patient states that she has tried every over-the-counter medication that he can think of without relief. Patient also reports that this morning while cleaning her ears with Q-tips she noticed blood on the end of a Q-tip. Denies any fevers or chills at home. States that the cough is mildly productive. Candance Huger PCP: Delwin Opitz, MD    Current Outpatient Medications   Medication Sig Dispense Refill    doxycycline (VIBRA-TABS) 100 mg tablet Take 1 Tab by mouth two (2) times a day for 7 days. 14 Tab 0    cyanocobalamin 1,000 mcg tablet 1,000 mcg.  cholecalciferol (VITAMIN D3) (5000 Units/125 mcg) tab tablet 5,000 Units.  hydrALAZINE (APRESOLINE) 25 mg tablet Take 25 mg by mouth three (3) times daily.  omeprazole (PRILOSEC) 40 mg capsule Take 40 mg by mouth daily.  Biotin 2,500 mcg cap Take  by mouth.          Past History     Past Medical History:  Past Medical History:   Diagnosis Date    Anemia NEC     Arthritis     Bell's palsy     Dyspepsia and other specified disorders of function of stomach     Endocrine disease     anemic    Kidney stone        Past Surgical History:  Past Surgical History:   Procedure Laterality Date    ABDOMEN SURGERY PROC UNLISTED  12/24/2012    cholecystectomy    HX CHOLECYSTECTOMY  2012    HX GYN  1992    ceserean    HX GYN  2001    tubal    HX OTHER SURGICAL      tubal ligation    [place] ERCP REMOVE CALCULI/DEBRIS BILIARY/PANCREAS DUCT  12/26/2012         [place] ERCP W/SPHINCTEROTOMY/PAPILLOTOMY  12/26/2012            Family History:  Family History   Problem Relation Age of Onset    Hypertension Mother     Stroke Father     Hypertension Father     Heart Disease Father     Cancer Father         leukemia    Heart Attack Other         grandfather - 76s    Heart Disease Daughter        Social History:  Social History     Tobacco Use    Smoking status: Never Smoker    Smokeless tobacco: Never Used   Substance Use Topics    Alcohol use: No    Drug use: No       Allergies: Allergies   Allergen Reactions    Codeine Hives    Lactose Diarrhea         Review of Systems       Review of Systems   Constitutional: Negative for chills, diaphoresis, fatigue and fever. HENT: Positive for ear discharge. Negative for congestion, ear pain, sinus pressure, sinus pain and sore throat. Respiratory: Positive for cough. Negative for chest tightness, shortness of breath and wheezing. Cardiovascular: Negative for chest pain. Gastrointestinal: Negative for abdominal pain. Genitourinary: Negative. Neurological: Negative. All other systems reviewed and are negative. Physical Exam     Visit Vitals  BP (!) 181/93 (BP 1 Location: Right arm, BP Patient Position: At rest)   Pulse 97   Temp 98.3 °F (36.8 °C)   Resp 17   Ht 5' (1.524 m)   Wt 123.4 kg (272 lb)   LMP 12/16/2019 (Exact Date)   SpO2 98%   BMI 53.12 kg/m²         Physical Exam  Vitals signs reviewed. Constitutional:       General: She is not in acute distress. Appearance: Normal appearance. She is obese. She is not ill-appearing, toxic-appearing or diaphoretic. HENT:      Head: Normocephalic and atraumatic. Right Ear: Tympanic membrane and external ear normal.      Left Ear: Tympanic membrane, ear canal and external ear normal.      Ears:      Comments: Excoriations of the right auditory canal, no visible blood. Nose: Nose normal.      Mouth/Throat:      Mouth: Mucous membranes are moist.      Pharynx: Oropharynx is clear.    Eyes:      Extraocular Movements: Extraocular movements intact. Neck:      Musculoskeletal: Neck supple. Cardiovascular:      Rate and Rhythm: Normal rate and regular rhythm. Pulses: Normal pulses. Heart sounds: No murmur. No gallop. Pulmonary:      Effort: Pulmonary effort is normal.      Breath sounds: Normal breath sounds. No wheezing, rhonchi or rales. Skin:     General: Skin is warm and dry. Capillary Refill: Capillary refill takes less than 2 seconds. Neurological:      General: No focal deficit present. Mental Status: She is alert and oriented to person, place, and time. Diagnostic Study Results     Labs -  No results found for this or any previous visit (from the past 12 hour(s)). Radiologic Studies -   XR CHEST PA LAT    (Results Pending)         Medical Decision Making   I am the first provider for this patient. I reviewed the vital signs, available nursing notes, past medical history, past surgical history, family history and social history. Vital Signs-Reviewed the patient's vital signs. Records Reviewed: Nursing Notes and Old Medical Records (Time of Review: 9:25 PM)    ED Course: Progress Notes, Reevaluation, and Consults:  9:25 PM met with patient, reviewed history, performed physical exam.  Given complaint of cough x1 month, will obtain chest x-ray. 10:09 PM radiograph reviewed, possible haziness or consolidation to right middle lobe/right lower lobe. Given patient's complaint of cough x1 month, will provide prescription for oral antibiotics and treat for pneumonia. Provider Notes (Medical Decision Making):   82-year-old female seen in the emergency department for complaints of cough and bloody drainage from her right ear. Physical exam shows normal TMs bilaterally with faint excoriations to the right auditory canal.        Diagnosis     Clinical Impression:   1.  Community acquired pneumonia of right lung, unspecified part of lung        Disposition: home Follow-up Information     Follow up With Specialties Details Why Contact Info    Juno Alexandra MD Family Practice Call in 1 day For follow up regarding ER visit. [address] [address]  [phone]      [facility] EMERGENCY DEPT Emergency Medicine  Immediately if symptoms worsen. Lucia Martinez  [phone]           Patient's Medications   Start Taking    DOXYCYCLINE (VIBRA-TABS) 100 MG TABLET    Take 1 Tab by mouth two (2) times a day for 7 days. Continue Taking    BIOTIN 2,500 MCG CAP    Take  by mouth. CHOLECALCIFEROL (VITAMIN D3) (5000 UNITS/125 MCG) TAB TABLET    5,000 Units. CYANOCOBALAMIN 1,000 MCG TABLET    1,000 mcg. HYDRALAZINE (APRESOLINE) 25 MG TABLET    Take 25 mg by mouth three (3) times daily. OMEPRAZOLE (PRILOSEC) 40 MG CAPSULE    Take 40 mg by mouth daily. These Medications have changed    No medications on file   Stop Taking    CHLORPHENIRAMINE-HYDROCODONE (TUSSIONEX PENNKINETIC ER) 10-8 MG/5 ML SUSPENSION    Take 5 mL by mouth every twelve (12) hours as needed for Cough. Max Daily Amount: 10 mL. RANITIDINE (ZANTAC 75) 75 MG TABLET    Take 75 mg by mouth two (2) times a day. Carlos Sadler PA-C    Dictation disclaimer:  Please note that this dictation was completed with [employer], the computer voice recognition software. Quite often unanticipated grammatical, syntax, homophones, and other interpretive errors are inadvertently transcribed by the computer software. Please disregard these errors. Please excuse any errors that have escaped final proofreading.

## 2020-01-03 NOTE — DISCHARGE INSTRUCTIONS
Patient Education        Pneumonia: Care Instructions  Your Care Instructions    Pneumonia is an infection of the lungs. Most cases are caused by infections from bacteria or viruses. Pneumonia may be mild or very severe. If it is caused by bacteria, you will be treated with antibiotics. It may take a few weeks to a few months to recover fully from pneumonia, depending on how sick you were and whether your overall health is good. Follow-up care is a key part of your treatment and safety. Be sure to make and go to all appointments, and call your doctor if you are having problems. It's also a good idea to know your test results and keep a list of the medicines you take. How can you care for yourself at home? · Take your antibiotics exactly as directed. Do not stop taking the medicine just because you are feeling better. You need to take the full course of antibiotics. · Take your medicines exactly as prescribed. Call your doctor if you think you are having a problem with your medicine. · Get plenty of rest and sleep. You may feel weak and tired for a while, but your energy level will improve with time. · To prevent dehydration, drink plenty of fluids, enough so that your urine is light yellow or clear like water. Choose water and other caffeine-free clear liquids until you feel better. If you have kidney, heart, or liver disease and have to limit fluids, talk with your doctor before you increase the amount of fluids you drink. · Take care of your cough so you can rest. A cough that brings up mucus from your lungs is common with pneumonia. It is one way your body gets rid of the infection. But if coughing keeps you from resting or causes severe fatigue and chest-wall pain, talk to your doctor. He or she may suggest that you take a medicine to reduce the cough. · Use a vaporizer or humidifier to add moisture to your bedroom. Follow the directions for cleaning the machine.   · Do not smoke or allow others to smoke around you. Smoke will make your cough last longer. If you need help quitting, talk to your doctor about stop-smoking programs and medicines. These can increase your chances of quitting for good. · Take an over-the-counter pain medicine, such as acetaminophen (Tylenol), ibuprofen (Advil, Motrin), or naproxen (Aleve). Read and follow all instructions on the label. · Do not take two or more pain medicines at the same time unless the doctor told you to. Many pain medicines have acetaminophen, which is Tylenol. Too much acetaminophen (Tylenol) can be harmful. · If you were given a spirometer to measure how well your lungs are working, use it as instructed. This can help your doctor tell how your recovery is going. · To prevent pneumonia in the future, talk to your doctor about getting a flu vaccine (once a year) and a pneumococcal vaccine (one time only for most people). When should you call for help? Call 911 anytime you think you may need emergency care. For example, call if:    · You have severe trouble breathing.    Call your doctor now or seek immediate medical care if:    · You cough up dark brown or bloody mucus (sputum).     · You have new or worse trouble breathing.     · You are dizzy or lightheaded, or you feel like you may faint.    Watch closely for changes in your health, and be sure to contact your doctor if:    · You have a new or higher fever.     · You are coughing more deeply or more often.     · You are not getting better after 2 days (48 hours).     · You do not get better as expected. Where can you learn more? Go to http://curtis-sherrie.info/. Enter [id number] in the search box to learn more about \"Pneumonia: Care Instructions. \"  Current as of: June 9, 2019  Content Version: 12.2  © 9201-4560 [employer]. Care instructions adapted under license by [employer] (which disclaims liability or warranty for this information).  If you have questions about a medical condition or this instruction, always ask your healthcare professional. [address] [address] any warranty or liability for your use of this information.

## 2020-01-03 NOTE — ED TRIAGE NOTES
Pt ambulatory to triage c/o congested cough in morning and nights x1 month. No fever, no N/V. States felt dull pain in right ear yesterday and cleaned ear today and noticed bright red blood on qtip.

## 2020-11-10 ENCOUNTER — APPOINTMENT (OUTPATIENT)
Dept: GENERAL RADIOLOGY | Age: 50
End: 2020-11-10
Attending: PHYSICIAN ASSISTANT
Payer: MEDICAID

## 2020-11-10 ENCOUNTER — HOSPITAL ENCOUNTER (EMERGENCY)
Age: 50
Discharge: HOME | End: 2020-11-10
Attending: EMERGENCY MEDICINE
Payer: MEDICAID

## 2020-11-10 VITALS
WEIGHT: 276 LBS | BODY MASS INDEX: 52.11 KG/M2 | TEMPERATURE: 98.3 F | HEIGHT: 61 IN | RESPIRATION RATE: 20 BRPM | HEART RATE: 97 BPM | SYSTOLIC BLOOD PRESSURE: 180 MMHG | DIASTOLIC BLOOD PRESSURE: 99 MMHG | OXYGEN SATURATION: 97 %

## 2020-11-10 DIAGNOSIS — L02.612 ABSCESS, TOE, LEFT: Primary | ICD-10-CM

## 2020-11-10 DIAGNOSIS — L03.116 CELLULITIS OF LEFT LOWER EXTREMITY: ICD-10-CM

## 2020-11-10 PROCEDURE — 75810000289 HC I&D ABSCESS SIMP/COMP/MULT

## 2020-11-10 PROCEDURE — 99281 EMR DPT VST MAYX REQ PHY/QHP: CPT

## 2020-11-10 PROCEDURE — 73660 X-RAY EXAM OF TOE(S): CPT

## 2020-11-10 RX ORDER — SULFAMETHOXAZOLE AND TRIMETHOPRIM 800; 160 MG/1; MG/1
1 TABLET ORAL 2 TIMES DAILY
Qty: 20 TAB | Refills: 0 | Status: SHIPPED | OUTPATIENT
Start: 2020-11-10 | End: 2020-11-20

## 2020-11-10 RX ORDER — CEPHALEXIN 500 MG/1
500 CAPSULE ORAL 4 TIMES DAILY
Qty: 28 CAP | Refills: 0 | Status: SHIPPED | OUTPATIENT
Start: 2020-11-10 | End: 2020-11-17

## 2020-11-10 RX ORDER — HYDROCODONE BITARTRATE AND ACETAMINOPHEN 5; 325 MG/1; MG/1
1 TABLET ORAL
Status: DISCONTINUED | OUTPATIENT
Start: 2020-11-10 | End: 2020-11-10 | Stop reason: HOSPADM

## 2020-11-10 RX ORDER — HYDROCHLOROTHIAZIDE 12.5 MG/1
12.5 TABLET ORAL DAILY
Qty: 30 TAB | Refills: 0 | Status: SHIPPED | OUTPATIENT
Start: 2020-11-10 | End: 2020-12-10

## 2020-11-10 NOTE — LETTER
[facility] EMERGENCY DEPT 
[address] 
[address] [facility] [address] [phone] Work/School Note Date: 11/10/2020 To Whom It May concern: 
 
Mehrdad Reina was seen and treated today in the emergency room by the following provider(s): 
Attending Provider: Mike Haas DO Physician Assistant: DESTINY Ramos. Mehrdad Reina may return to work on Wednesday Nov 11, 2020.  
 
Sincerely, 
 
 
 
 
Tu Londono RN

## 2020-11-10 NOTE — ED PROVIDER NOTES
[facility] EMERGENCY DEPT    Patient Name: Juve Webster    History of Presenting Illness     Chief Complaint   Patient presents with    Toe Pain     52 y.o. female with noted past medical history presents the ED complaining of redness/pain/swelling to her left great toe onset 4 days ago. Patient states she initially started while walking, has since stopped it a second time and had it stepped on by the dog. She notes having worsening redness and now a white pocket to the distal tip. Patient states initially when she stubbed it there was orangey red crust to the right now. Denies any numbness, weakness, fever, chills, other symptoms. Patient does report having persistently high blood pressure, sees her doctor this month for this. Patient denies any other associated signs or symptoms. Patient denies any other complaints. Nursing notes regarding the HPI and triage nursing notes were reviewed. Prior medical records were reviewed. Current Facility-Administered Medications   Medication Dose Route Frequency Provider Last Rate Last Dose    HYDROcodone-acetaminophen (NORCO) 5-325 mg per tablet 1 Tab  1 Tab Oral NOW DESTINY Novak         Current Outpatient Medications   Medication Sig Dispense Refill    trimethoprim-sulfamethoxazole (Bactrim DS) 160-800 mg per tablet Take 1 Tab by mouth two (2) times a day for 10 days. 20 Tab 0    cephALEXin (Keflex) 500 mg capsule Take 1 Cap by mouth four (4) times daily for 7 days. 28 Cap 0    hydroCHLOROthiazide (HYDRODIURIL) 12.5 mg tablet Take 1 Tab by mouth daily for 30 days. 30 Tab 0    cyanocobalamin 1,000 mcg tablet 1,000 mcg.  cholecalciferol (VITAMIN D3) (5000 Units/125 mcg) tab tablet 5,000 Units.  hydrALAZINE (APRESOLINE) 25 mg tablet Take 1 Tab by mouth three (3) times daily. 15 Tab 0    omeprazole (PRILOSEC) 40 mg capsule Take 40 mg by mouth daily.  Biotin 2,500 mcg cap Take  by mouth.          Past History     Past Medical History:  Past Medical History:   Diagnosis Date    Anemia NEC     Arthritis     Bell's palsy     Dyspepsia and other specified disorders of function of stomach     Endocrine disease     anemic    Kidney stone        Past Surgical History:  Past Surgical History:   Procedure Laterality Date    ABDOMEN SURGERY PROC UNLISTED  12/24/2012    cholecystectomy    HX CHOLECYSTECTOMY  2012    HX GYN  1992    ceserean    HX GYN  2001    tubal    HX OTHER SURGICAL      tubal ligation    [place] ERCP REMOVE CALCULI/DEBRIS BILIARY/PANCREAS DUCT  12/26/2012         [place] ERCP W/SPHINCTEROTOMY/PAPILLOTOMY  12/26/2012            Family History:  Family History   Problem Relation Age of Onset    Hypertension Mother     Stroke Father     Hypertension Father     Heart Disease Father     Cancer Father         leukemia    Heart Attack Other         grandfather - 76s    Heart Disease Daughter        Social History:  Social History     Tobacco Use    Smoking status: Never Smoker    Smokeless tobacco: Never Used   Substance Use Topics    Alcohol use: No    Drug use: No       Allergies: Allergies   Allergen Reactions    Codeine Hives    Lactose Diarrhea       Patient's primary care provider (as noted in EPIC):  None    Review of Systems   Constitutional:  Denies malaise, fever, chills. Extremity/MS:  + Right great toe pain/swelling/redness. Neuro:  Denies headache, LOC, dizziness, neurologic symptoms/deficits/paresthesias. Skin: + redness/\"white pocket\" to left great toe  All other systems negative as reviewed. Visit Vitals  BP (!) 180/99 (BP 1 Location: Left arm, BP Patient Position: Sitting)   Pulse 97   Temp 98.3 °F (36.8 °C)   Resp 20   Ht 5' 1\" (1.549 m)   Wt 125.2 kg (276 lb)   SpO2 97%   BMI 52.15 kg/m²       PHYSICAL EXAM:    CONSTITUTIONAL:  Alert, in no apparent distress;  well developed;  well nourished. HEAD:  Normocephalic, atraumatic. EYES:  EOMI. Non-icteric sclera. Normal conjunctiva.   ENTM: Mouth: mucous membranes moist.  NECK: Supple  RESPIRATORY:  Chest clear, equal breath sounds, good air movement. Without wheezes, rhonchi or rales. CARDIOVASCULAR:  Regular rate and rhythm. No murmurs, rubs, or gallops. UPPER EXT:  Normal inspection. LOWER EXT:  Left great toe with reproducible tenderness to palpation throughout, moderate edema noted, erythema to the region surrounding nail plate, single large fluctuant white lesion noted to distal tip; nail plate lifted up but still intact. NEURO:  Moves all four extremities, and grossly normal motor exam.  SKIN:  No rashes;  Normal for age. PSYCH:  Alert and normal affect. DIFFERENTIAL DIAGNOSES/ MEDICAL DECISION MAKING:  Contusion, sprain, dislocation, fracture, ligamentous tear/ disruption or a combination of the above. Xr Great Toe Lt Min 2 V    Result Date: 11/10/2020  EXAM: Left Great Toe, 3 views CLINICAL INDICATION/HISTORY: Left foot swelling, pain   > Additional: None. COMPARISON: None. TECHNIQUE: Three separate views, left foot great toe. _______________ FINDINGS: Alignment normal.  No fracture. Mild left first MPT joint osteoarthritis. Mild diffuse soft tissue swelling without retained radiopaque foreign object. Small bone island in the second toe proximal phalanx. _______________     IMPRESSION: Degenerative findings with soft tissue swelling. No fracture. I&D Abcess Simple    Date/Time: 11/10/2020 6:50 PM  Performed by: DESTINY Mancia  Authorized by: DESTINY Mancia     Consent:     Consent obtained:  Written    Consent given by:  Patient    Risks discussed:  Bleeding, incomplete drainage and pain  Location:     Type:  Abscess  Pre-procedure details:     Skin preparation:  Hibiclens  Anesthesia (see MAR for exact dosages):      Anesthesia method:  None  Procedure type:     Complexity:  Simple  Procedure details:     Needle aspiration: no      Incision types:  Single straight    Incision depth:  Dermal    Scalpel blade: 11    Drainage:  Purulent and bloody    Drainage amount: Moderate    Wound treatment:  Wound left open    Packing materials:  None  Post-procedure details:     Patient tolerance of procedure: Tolerated well, no immediate complications      Patient has had 3 injuries to her left great toe over the past 4 days. States that initially she stubbed it, had some scant blood noted adjacent to the nail. She has since developed erythema, edema to the region surrounding her nail plate/distal toe. There is fluctuance to the distal toe, which was drained approximately 4 cc of bloody purulence. Patient was counseled to do warm soaks at home, take Keflex and Bactrim, follow-up with podiatry without fail. She will return immediately for any acute worsening. Diagnosis:   1. Abscess, toe, left    2. Cellulitis of left lower extremity      Disposition: Discharge    Follow-up Information     Follow up With Specialties Details Why Contact Info    Vida Kaplan DPM Podiatry In 3 days  [address]  [address]  In 3 days For wound re-check and for your blood pressure  [address] [place]  [address] ([facility]) [place]  In 3 days if you cannot get into [address] [place] [id number] [phone]    [facility] EMERGENCY DEPT Emergency Medicine  If symptoms worsen [address]  [phone]          Discharge Medication List as of 11/10/2020  5:09 PM      START taking these medications    Details   trimethoprim-sulfamethoxazole (Bactrim DS) 160-800 mg per tablet Take 1 Tab by mouth two (2) times a day for 10 days. , Print, Disp-20 Tab,R-0      cephALEXin (Keflex) 500 mg capsule Take 1 Cap by mouth four (4) times daily for 7 days. , Print, Disp-28 Cap,R-0      hydroCHLOROthiazide (HYDRODIURIL) 12.5 mg tablet Take 1 Tab by mouth daily for 30 days. , Print, Disp-30 Tab,R-0         CONTINUE these medications which have NOT CHANGED    Details   cyanocobalamin 1,000 mcg tablet 1,000 mcg., Historical Med      cholecalciferol (VITAMIN D3) (5000 Units/125 mcg) tab tablet 5,000 Units. , Historical Med      hydrALAZINE (APRESOLINE) 25 mg tablet Take 1 Tab by mouth three (3) times daily. , Normal, Disp-15 Tab, R-0      omeprazole (PRILOSEC) 40 mg capsule Take 40 mg by mouth daily. , Historical Med      Biotin 2,500 mcg cap Take  by mouth., Historical Med           DESTINY Bennett

## 2020-11-10 NOTE — DISCHARGE INSTRUCTIONS
Patient Education        Skin Abscess: Care Instructions  Your Care Instructions     A skin abscess is a bacterial infection that forms a pocket of pus. A boil is a kind of skin abscess. The doctor may have cut an opening in the abscess so that the pus can drain out. You may have gauze in the cut so that the abscess will stay open and keep draining. You may need antibiotics. You will need to follow up with your doctor to make sure the infection has gone away. The doctor has checked you carefully, but problems can develop later. If you notice any problems or new symptoms, get medical treatment right away. Follow-up care is a key part of your treatment and safety. Be sure to make and go to all appointments, and call your doctor if you are having problems. It's also a good idea to know your test results and keep a list of the medicines you take. How can you care for yourself at home? · Apply warm and dry compresses, a heating pad set on low, or a hot water bottle 3 or 4 times a day for pain. Keep a cloth between the heat source and your skin. · If your doctor prescribed antibiotics, take them as directed. Do not stop taking them just because you feel better. You need to take the full course of antibiotics. · Take pain medicines exactly as directed. ? If the doctor gave you a prescription medicine for pain, take it as prescribed. ? If you are not taking a prescription pain medicine, ask your doctor if you can take an over-the-counter medicine. · Keep your bandage clean and dry. Change the bandage whenever it gets wet or dirty, or at least one time a day. · If the abscess was packed with gauze:  ? Keep follow-up appointments to have the gauze changed or removed. If the doctor instructed you to remove the gauze, follow the instructions you were given for how to remove it. ? After the gauze is removed, soak the area in warm water for 15 to 20 minutes 2 times a day, until the wound closes.   When should you call for help? Call your doctor now or seek immediate medical care if:    · You have signs of worsening infection, such as:  ? Increased pain, swelling, warmth, or redness. ? Red streaks leading from the infected skin. ? Pus draining from the wound. ? A fever. Watch closely for changes in your health, and be sure to contact your doctor if:    · You do not get better as expected. Where can you learn more? Go to http://www.gray.com/  Enter D143 in the search box to learn more about \"Skin Abscess: Care Instructions. \"  Current as of: July 2, 2020               Content Version: 12.6  © 2844-4908 [employer]. Care instructions adapted under license by "[employer]" (which disclaims liability or warranty for this information). If you have questions about a medical condition or this instruction, always ask your healthcare professional. [place] [address] any warranty or liability for your use of this information. Patient Education        Cellulitis: Care Instructions  Your Care Instructions     Cellulitis is a skin infection caused by bacteria, most often strep or staph. It often occurs after a break in the skin from a scrape, cut, bite, or puncture, or after a rash. Cellulitis may be treated without doing tests to find out what caused it. But your doctor may do tests, if needed, to look for a specific bacteria, like methicillin-resistant Staphylococcus aureus (MRSA). The doctor has checked you carefully, but problems can develop later. If you notice any problems or new symptoms, get medical treatment right away. Follow-up care is a key part of your treatment and safety. Be sure to make and go to all appointments, and call your doctor if you are having problems. It's also a good idea to know your test results and keep a list of the medicines you take. How can you care for yourself at home? · Take your antibiotics as directed.  Do not stop taking them just because you feel better. You need to take the full course of antibiotics. · Prop up the infected area on pillows to reduce pain and swelling. Try to keep the area above the level of your heart as often as you can. · If your doctor told you how to care for your wound, follow your doctor's instructions. If you did not get instructions, follow this general advice:  ? Wash the wound with clean water 2 times a day. Don't use hydrogen peroxide or alcohol, which can slow healing. ? You may cover the wound with a thin layer of petroleum jelly, such as Vaseline, and a nonstick bandage. ? Apply more petroleum jelly and replace the bandage as needed. · Be safe with medicines. Take pain medicines exactly as directed. ? If the doctor gave you a prescription medicine for pain, take it as prescribed. ? If you are not taking a prescription pain medicine, ask your doctor if you can take an over-the-counter medicine. To prevent cellulitis in the future  · Try to prevent cuts, scrapes, or other injuries to your skin. Cellulitis most often occurs where there is a break in the skin. · If you get a scrape, cut, mild burn, or bite, wash the wound with clean water as soon as you can to help avoid infection. Don't use hydrogen peroxide or alcohol, which can slow healing. · If you have swelling in your legs (edema), support stockings and good skin care may help prevent leg sores and cellulitis. · Take care of your feet, especially if you have diabetes or other conditions that increase the risk of infection. Wear shoes and socks. Do not go barefoot. If you have athlete's foot or other skin problems on your feet, talk to your doctor about how to treat them. When should you call for help? Call your doctor now or seek immediate medical care if:    · You have signs that your infection is getting worse, such as:  ? Increased pain, swelling, warmth, or redness. ? Red streaks leading from the area.   ? Pus draining from the area. ? A fever.     · You get a rash. Watch closely for changes in your health, and be sure to contact your doctor if:    · You do not get better as expected. Where can you learn more? Go to http://www.gray.com/  Enter X309 in the search box to learn more about \"Cellulitis: Care Instructions. \"  Current as of: July 2, 2020               Content Version: 12.6  © 2006-2020 [employer]. Care instructions adapted under license by [employer] (which disclaims liability or warranty for this information). If you have questions about a medical condition or this instruction, always ask your healthcare professional. [address] any warranty or liability for your use of this information.

## 2020-11-10 NOTE — LETTER
[address] EMERGENCY DEPT 
[address] 
[address] [facility] [address] [phone] Work/School Note Date: 11/10/2020 To Whom It May concern: 
 
Ria Moritz was seen and treated today in the emergency room by the following provider(s): 
Attending Provider: Lakshmi Rodríguez DO Physician Assistant: DESTINY Rangel. Karen Rey may return to work on 11/11/2020. Sincerely, DESTINY Cunha

## 2022-05-13 ENCOUNTER — HOSPITAL ENCOUNTER (OUTPATIENT)
Dept: LAB | Age: 52
Discharge: HOME | End: 2022-05-13

## 2022-05-13 LAB — SENTARA SPECIMEN COL,SENBCF: NORMAL

## 2022-05-13 PROCEDURE — 99001 SPECIMEN HANDLING PT-LAB: CPT

## 2023-09-11 ENCOUNTER — HOSPITAL ENCOUNTER (OUTPATIENT)
Facility: HOSPITAL | Age: 53
Discharge: HOME | End: 2023-09-14
Payer: COMMERCIAL

## 2023-09-11 ENCOUNTER — HOSPITAL ENCOUNTER (OUTPATIENT)
Facility: HOSPITAL | Age: 53
Discharge: HOME | End: 2023-09-14

## 2023-09-11 DIAGNOSIS — G56.02 CARPAL TUNNEL SYNDROME ON LEFT: ICD-10-CM

## 2023-09-11 LAB
EKG ATRIAL RATE: 93 BPM
EKG DIAGNOSIS: NORMAL
EKG P AXIS: 13 DEGREES
EKG P-R INTERVAL: 160 MS
EKG Q-T INTERVAL: 354 MS
EKG QRS DURATION: 76 MS
EKG QTC CALCULATION (BAZETT): 440 MS
EKG R AXIS: 8 DEGREES
EKG T AXIS: 15 DEGREES
EKG VENTRICULAR RATE: 93 BPM
SENTARA SPECIMEN COLLECTION: NORMAL

## 2023-09-11 PROCEDURE — 93005 ELECTROCARDIOGRAM TRACING: CPT

## 2023-09-11 NOTE — PERIOP NOTE
Neck 16.75 inches. Denies having sleep apnea, kidney, or autoimmune diseases. Not on Lithium or Digoxin. Has diabetes and fatty liver diseases.

## 2023-09-14 ENCOUNTER — ANESTHESIA EVENT (OUTPATIENT)
Facility: HOSPITAL | Age: 53
End: 2023-09-14
Payer: COMMERCIAL

## 2023-09-14 PROBLEM — M25.531 RIGHT WRIST PAIN: Status: ACTIVE | Noted: 2023-09-14

## 2023-09-14 PROBLEM — G56.01 CARPAL TUNNEL SYNDROME ON RIGHT: Status: ACTIVE | Noted: 2023-09-14

## 2023-09-14 NOTE — H&P
Patient Name:   Duyen Alvarez     YOB: 1970      Chief Complaint:  Physical therapy followup, EMG followup. History of Chief Complaint:  Batool Feng presents today. She was doing physical therapy at [facility]. Overall, she had no significant improvement with therapy. She continues to have diffuse low back pain. Now she feels like she has developed some mid-back pain as well. It seems to be worse with activity, specifically cleaning her house. She will get radiating symptoms into the legs. She is morbidly obese. She did have an EMG study in July, which does show bilateral moderate to severe carpal tunnel syndrome. She has been dealing with this for years now, and it is affecting her ability to work and be active. She states her hand is constantly numb. She is right hand dominant. We have discussed conservative and surgical options, and she would like to proceed with surgery. Past Medical/Surgical History:    Disease/Disorder Date Side Surgery Date Side Comment   Arthritis         Diabetes         Hypertension            Bilateral tubal ligation 2001        Breast biopsy  left [facility] 08/23/2023 - per MRI questionnaire      Cholecystectomy 2012        Foot surgery 2014        Foot surgery 2015       Allergies:    Ingredient Reaction Medication Name Comment   CODEINE        Current Medications:    Medication Directions   Advair Diskus 250 mcg-50 mcg/dose powder for inhalation    CombiPatch 0.05 mg-0.25 mg/24 hr transdermal    dicyclomine 10 mg capsule    hydrochlorothiazide 25 mg tablet    losartan 50 mg tablet    metformin 500 mg tablet    omeprazole 40 mg capsule,delayed release    rosuvastatin 5 mg tablet    Spiriva Respimat 1.25 mcg/actuation solution for inhalation    Trulicity 7.99 NP/3.8 mL subcutaneous pen injector    Vitamin B-12  1,000 mcg tablet      Social History:    SMOKING  Status Tobacco Type Units Per Day Yrs Used   Never smoker        ALCOHOL  There is no history of alcohol use.

## 2023-09-14 NOTE — DISCHARGE INSTRUCTIONS
[facility]  Dr. Prasad Adams Post-Operative Instructions Carpal Tunnel Release    Diet:  1. Begin with liquids and light foods such as Jell-O and soups. 2. Advance as tolerated to your regular diet if not nauseated. First 24 hours:  1. Be in the care of a responsible adult. 2. Do not drive or operate machinery. 3. Do not drink alcoholic beverages. Activities:  1. Elevate the operative hand and ice for the next 48 hours; 20 minutes on / 20 minutes off  2.. Return to work depends on your type of employment. 3.  Follow-up with Dr. Antoinette Roman in 7-10 days post-operatively. Wound Care:  1. Maintain your postoperative dressing. Loosen the ACE wrap if swelling of the fingers occurs. 2. Remove your surgical dressing on the third post op day. Cover the wound with Band-Aids and then place on the Velcro splint that was given pre-operatively. 3. Keep the surgical incisions dry until your sutures are removed when you see your doctor. Use a plastic bag with rubber bands to cover the limb during showers. Immersing the limb in water is to be avoided. Medications:  1. Strong oral narcotic pain medications have been prescribed for the first few days. Use only as directed. No pain medication is capable of taking away all the pain. Taking your pills at regular intervals will give you the best chance of having less pain. 2. If you need a refill PLEASE PLAN AHEAD. Call our office during regular hours (8-5). 3. Do not combine with alcoholic beverages. 4. Be careful as you walk, climb stairs or drive as mild dizziness is not unusual.  5. Do not take medications that have not been prescribed by your surgeon. 6. You may switch to over the counter pain medication of your choice as you become more comfortable. WHEN TO CALL YOUR SURGEON:  1. Significant swelling or any new numbness in the limb that was operated on  2. Unrelenting pain  3. Fever or Chills  4. Redness around incisions  5. Color change in the fingers or hand  6.

## 2023-09-21 ENCOUNTER — HOSPITAL ENCOUNTER (OUTPATIENT)
Facility: HOSPITAL | Age: 53
Setting detail: OUTPATIENT SURGERY
Discharge: HOME | End: 2023-09-21
Attending: ORTHOPAEDIC SURGERY | Admitting: ORTHOPAEDIC SURGERY
Payer: COMMERCIAL

## 2023-09-21 ENCOUNTER — ANESTHESIA (OUTPATIENT)
Facility: HOSPITAL | Age: 53
End: 2023-09-21
Payer: COMMERCIAL

## 2023-09-21 VITALS
DIASTOLIC BLOOD PRESSURE: 60 MMHG | BODY MASS INDEX: 52.54 KG/M2 | WEIGHT: 267.6 LBS | HEIGHT: 60 IN | RESPIRATION RATE: 18 BRPM | TEMPERATURE: 98.2 F | SYSTOLIC BLOOD PRESSURE: 136 MMHG | HEART RATE: 80 BPM | OXYGEN SATURATION: 98 %

## 2023-09-21 DIAGNOSIS — M25.531 RIGHT WRIST PAIN: Primary | ICD-10-CM

## 2023-09-21 LAB
GLUCOSE BLD STRIP.AUTO-MCNC: 121 MG/DL (ref 70–110)
GLUCOSE BLD STRIP.AUTO-MCNC: 151 MG/DL (ref 70–110)
HCG UR QL: NEGATIVE

## 2023-09-21 PROCEDURE — 2580000003 HC RX 258: Performed by: ORTHOPAEDIC SURGERY

## 2023-09-21 PROCEDURE — 6360000002 HC RX W HCPCS: Performed by: ORTHOPAEDIC SURGERY

## 2023-09-21 PROCEDURE — 3600000012 HC SURGERY LEVEL 2 ADDTL 15MIN: Performed by: ORTHOPAEDIC SURGERY

## 2023-09-21 PROCEDURE — 7100000011 HC PHASE II RECOVERY - ADDTL 15 MIN: Performed by: ORTHOPAEDIC SURGERY

## 2023-09-21 PROCEDURE — 2500000003 HC RX 250 WO HCPCS: Performed by: NURSE ANESTHETIST, CERTIFIED REGISTERED

## 2023-09-21 PROCEDURE — 81025 URINE PREGNANCY TEST: CPT

## 2023-09-21 PROCEDURE — 82962 GLUCOSE BLOOD TEST: CPT

## 2023-09-21 PROCEDURE — 6360000002 HC RX W HCPCS: Performed by: STUDENT IN AN ORGANIZED HEALTH CARE EDUCATION/TRAINING PROGRAM

## 2023-09-21 PROCEDURE — 6360000002 HC RX W HCPCS: Performed by: NURSE ANESTHETIST, CERTIFIED REGISTERED

## 2023-09-21 PROCEDURE — 3700000001 HC ADD 15 MINUTES (ANESTHESIA): Performed by: ORTHOPAEDIC SURGERY

## 2023-09-21 PROCEDURE — 7100000000 HC PACU RECOVERY - FIRST 15 MIN: Performed by: ORTHOPAEDIC SURGERY

## 2023-09-21 PROCEDURE — 3600000002 HC SURGERY LEVEL 2 BASE: Performed by: ORTHOPAEDIC SURGERY

## 2023-09-21 PROCEDURE — 7100000001 HC PACU RECOVERY - ADDTL 15 MIN: Performed by: ORTHOPAEDIC SURGERY

## 2023-09-21 PROCEDURE — 2709999900 HC NON-CHARGEABLE SUPPLY: Performed by: ORTHOPAEDIC SURGERY

## 2023-09-21 PROCEDURE — 7100000010 HC PHASE II RECOVERY - FIRST 15 MIN: Performed by: ORTHOPAEDIC SURGERY

## 2023-09-21 PROCEDURE — A4217 STERILE WATER/SALINE, 500 ML: HCPCS | Performed by: ORTHOPAEDIC SURGERY

## 2023-09-21 PROCEDURE — 3700000000 HC ANESTHESIA ATTENDED CARE: Performed by: ORTHOPAEDIC SURGERY

## 2023-09-21 RX ORDER — SODIUM CHLORIDE, SODIUM LACTATE, POTASSIUM CHLORIDE, CALCIUM CHLORIDE 600; 310; 30; 20 MG/100ML; MG/100ML; MG/100ML; MG/100ML
INJECTION, SOLUTION INTRAVENOUS CONTINUOUS
Status: DISCONTINUED | OUTPATIENT
Start: 2023-09-21 | End: 2023-09-21 | Stop reason: HOSPADM

## 2023-09-21 RX ORDER — FENTANYL CITRATE 50 UG/ML
INJECTION, SOLUTION INTRAMUSCULAR; INTRAVENOUS PRN
Status: DISCONTINUED | OUTPATIENT
Start: 2023-09-21 | End: 2023-09-21 | Stop reason: SDUPTHER

## 2023-09-21 RX ORDER — SODIUM CHLORIDE 0.9 % (FLUSH) 0.9 %
5-40 SYRINGE (ML) INJECTION EVERY 12 HOURS SCHEDULED
Status: DISCONTINUED | OUTPATIENT
Start: 2023-09-21 | End: 2023-09-21 | Stop reason: HOSPADM

## 2023-09-21 RX ORDER — MIDAZOLAM HYDROCHLORIDE 1 MG/ML
INJECTION INTRAMUSCULAR; INTRAVENOUS PRN
Status: DISCONTINUED | OUTPATIENT
Start: 2023-09-21 | End: 2023-09-21 | Stop reason: SDUPTHER

## 2023-09-21 RX ORDER — ONDANSETRON 2 MG/ML
4 INJECTION INTRAMUSCULAR; INTRAVENOUS
Status: DISCONTINUED | OUTPATIENT
Start: 2023-09-21 | End: 2023-09-21 | Stop reason: HOSPADM

## 2023-09-21 RX ORDER — SODIUM CHLORIDE 0.9 % (FLUSH) 0.9 %
5-40 SYRINGE (ML) INJECTION PRN
Status: DISCONTINUED | OUTPATIENT
Start: 2023-09-21 | End: 2023-09-21 | Stop reason: HOSPADM

## 2023-09-21 RX ORDER — BUPIVACAINE HYDROCHLORIDE 2.5 MG/ML
INJECTION, SOLUTION EPIDURAL; INFILTRATION; INTRACAUDAL PRN
Status: DISCONTINUED | OUTPATIENT
Start: 2023-09-21 | End: 2023-09-21 | Stop reason: ALTCHOICE

## 2023-09-21 RX ORDER — SODIUM CHLORIDE 9 MG/ML
INJECTION, SOLUTION INTRAVENOUS PRN
Status: DISCONTINUED | OUTPATIENT
Start: 2023-09-21 | End: 2023-09-21 | Stop reason: HOSPADM

## 2023-09-21 RX ORDER — ONDANSETRON 2 MG/ML
INJECTION INTRAMUSCULAR; INTRAVENOUS PRN
Status: DISCONTINUED | OUTPATIENT
Start: 2023-09-21 | End: 2023-09-21 | Stop reason: SDUPTHER

## 2023-09-21 RX ORDER — PROPOFOL 10 MG/ML
INJECTION, EMULSION INTRAVENOUS PRN
Status: DISCONTINUED | OUTPATIENT
Start: 2023-09-21 | End: 2023-09-21 | Stop reason: SDUPTHER

## 2023-09-21 RX ORDER — DROPERIDOL 2.5 MG/ML
0.62 INJECTION, SOLUTION INTRAMUSCULAR; INTRAVENOUS
Status: DISCONTINUED | OUTPATIENT
Start: 2023-09-21 | End: 2023-09-21 | Stop reason: HOSPADM

## 2023-09-21 RX ORDER — GLYCOPYRROLATE 0.2 MG/ML
INJECTION INTRAMUSCULAR; INTRAVENOUS PRN
Status: DISCONTINUED | OUTPATIENT
Start: 2023-09-21 | End: 2023-09-21 | Stop reason: SDUPTHER

## 2023-09-21 RX ORDER — HYDROMORPHONE HYDROCHLORIDE 1 MG/ML
0.5 INJECTION, SOLUTION INTRAMUSCULAR; INTRAVENOUS; SUBCUTANEOUS EVERY 5 MIN PRN
Status: DISCONTINUED | OUTPATIENT
Start: 2023-09-21 | End: 2023-09-21 | Stop reason: HOSPADM

## 2023-09-21 RX ORDER — TRAMADOL HYDROCHLORIDE 50 MG/1
50 TABLET ORAL EVERY 6 HOURS PRN
Qty: 20 TABLET | Refills: 0 | Status: SHIPPED | OUTPATIENT
Start: 2023-09-21 | End: 2023-09-26

## 2023-09-21 RX ORDER — LABETALOL HYDROCHLORIDE 5 MG/ML
10 INJECTION, SOLUTION INTRAVENOUS
Status: DISCONTINUED | OUTPATIENT
Start: 2023-09-21 | End: 2023-09-21 | Stop reason: HOSPADM

## 2023-09-21 RX ORDER — FENTANYL CITRATE 50 UG/ML
50 INJECTION, SOLUTION INTRAMUSCULAR; INTRAVENOUS EVERY 5 MIN PRN
Status: COMPLETED | OUTPATIENT
Start: 2023-09-21 | End: 2023-09-21

## 2023-09-21 RX ORDER — LIDOCAINE HYDROCHLORIDE 20 MG/ML
INJECTION, SOLUTION EPIDURAL; INFILTRATION; INTRACAUDAL; PERINEURAL PRN
Status: DISCONTINUED | OUTPATIENT
Start: 2023-09-21 | End: 2023-09-21 | Stop reason: SDUPTHER

## 2023-09-21 RX ORDER — CEPHALEXIN 500 MG/1
500 CAPSULE ORAL 4 TIMES DAILY
Qty: 28 CAPSULE | Refills: 0 | Status: SHIPPED | OUTPATIENT
Start: 2023-09-21 | End: 2023-09-28

## 2023-09-21 RX ORDER — MIDAZOLAM HYDROCHLORIDE 2 MG/2ML
2 INJECTION, SOLUTION INTRAMUSCULAR; INTRAVENOUS
Status: DISCONTINUED | OUTPATIENT
Start: 2023-09-21 | End: 2023-09-21 | Stop reason: HOSPADM

## 2023-09-21 RX ORDER — DIPHENHYDRAMINE HYDROCHLORIDE 50 MG/ML
12.5 INJECTION INTRAMUSCULAR; INTRAVENOUS
Status: DISCONTINUED | OUTPATIENT
Start: 2023-09-21 | End: 2023-09-21 | Stop reason: HOSPADM

## 2023-09-21 RX ADMIN — FENTANYL CITRATE 50 MCG: 50 INJECTION, SOLUTION INTRAMUSCULAR; INTRAVENOUS at 09:02

## 2023-09-21 RX ADMIN — LIDOCAINE HYDROCHLORIDE 80 MG: 20 INJECTION, SOLUTION EPIDURAL; INFILTRATION; INTRACAUDAL; PERINEURAL at 08:25

## 2023-09-21 RX ADMIN — FENTANYL CITRATE 50 MCG: 50 INJECTION, SOLUTION INTRAMUSCULAR; INTRAVENOUS at 09:15

## 2023-09-21 RX ADMIN — GLYCOPYRROLATE 0.2 MG: 0.2 INJECTION INTRAMUSCULAR; INTRAVENOUS at 08:22

## 2023-09-21 RX ADMIN — ONDANSETRON 4 MG: 2 INJECTION INTRAMUSCULAR; INTRAVENOUS at 08:22

## 2023-09-21 RX ADMIN — PROPOFOL 170 MG: 10 INJECTION, EMULSION INTRAVENOUS at 08:25

## 2023-09-21 RX ADMIN — WATER 3000 MG: 1 INJECTION INTRAMUSCULAR; INTRAVENOUS; SUBCUTANEOUS at 08:30

## 2023-09-21 RX ADMIN — SODIUM CHLORIDE, POTASSIUM CHLORIDE, SODIUM LACTATE AND CALCIUM CHLORIDE: 600; 310; 30; 20 INJECTION, SOLUTION INTRAVENOUS at 07:05

## 2023-09-21 RX ADMIN — FENTANYL CITRATE 50 MCG: 50 INJECTION, SOLUTION INTRAMUSCULAR; INTRAVENOUS at 08:31

## 2023-09-21 RX ADMIN — FENTANYL CITRATE 50 MCG: 50 INJECTION, SOLUTION INTRAMUSCULAR; INTRAVENOUS at 08:25

## 2023-09-21 RX ADMIN — MIDAZOLAM 2 MG: 1 INJECTION INTRAMUSCULAR; INTRAVENOUS at 08:20

## 2023-09-21 ASSESSMENT — PAIN DESCRIPTION - ONSET
ONSET: GRADUAL

## 2023-09-21 ASSESSMENT — PAIN DESCRIPTION - PAIN TYPE
TYPE: SURGICAL PAIN

## 2023-09-21 ASSESSMENT — PAIN DESCRIPTION - DESCRIPTORS
DESCRIPTORS: DULL;SORE
DESCRIPTORS: ACHING;THROBBING
DESCRIPTORS: ACHING;THROBBING

## 2023-09-21 ASSESSMENT — PAIN SCALES - GENERAL
PAINLEVEL_OUTOF10: 7
PAINLEVEL_OUTOF10: 4
PAINLEVEL_OUTOF10: 7
PAINLEVEL_OUTOF10: 0
PAINLEVEL_OUTOF10: 0

## 2023-09-21 ASSESSMENT — PAIN DESCRIPTION - FREQUENCY
FREQUENCY: INTERMITTENT

## 2023-09-21 ASSESSMENT — PAIN DESCRIPTION - ORIENTATION
ORIENTATION: RIGHT

## 2023-09-21 ASSESSMENT — PAIN DESCRIPTION - LOCATION
LOCATION: WRIST

## 2023-09-21 NOTE — PERIOP NOTE
Reviewed PTA medication list with patient/caregiver and patient/caregiver denies any additional medications. Patient admits to having a responsible adult care for them at home for at least 24 hours after surgery. Patient encouraged to use gown warming system and informed that using said warming gown to regulate body temperature prior to a procedure has been shown to help reduce the risks of blood clots and infection. Patient's pharmacy of choice verified and documented in PTA medication section. Dual skin assessment & fall risk band verification completed with Rian Richards RN.

## 2023-09-21 NOTE — PERIOP NOTE
TRANSFER - OUT REPORT:    Verbal report given to Luli Dougherty RN on Kamar Jacobo  being transferred to Phase 2 for routine post-op       Report consisted of patient's Situation, Background, Assessment and   Recommendations(SBAR). Information from the following report(s) Nurse Handoff Report was reviewed with the receiving nurse. Lines:   Peripheral IV 09/21/23 Left;Posterior Hand (Active)   Site Assessment Clean, dry & intact 09/21/23 0926   Line Status Infusing 09/21/23 0926   Phlebitis Assessment No symptoms 09/21/23 0926   Infiltration Assessment 0 09/21/23 0926   Alcohol Cap Used No 09/21/23 0926   Dressing Status Clean, dry & intact 09/21/23 0926   Dressing Type Transparent 09/21/23 0926        Opportunity for questions and clarification was provided.       Patient transported with:  [employer]

## 2023-09-21 NOTE — PERIOP NOTE
TRANSFER - IN REPORT:    Verbal report received from Latricia Villarreal Kavon on Rohit Goodwin  being received from PACU for routine post-op      Report consisted of patient's Situation, Background, Assessment and   Recommendations(SBAR). Information from the following report(s) Nurse Handoff Report, Surgery Report, Intake/Output, and MAR was reviewed with the receiving nurse. Opportunity for questions and clarification was provided. Assessment completed upon patient's arrival to unit and care assumed.

## 2023-09-21 NOTE — ANESTHESIA POSTPROCEDURE EVALUATION
Department of Anesthesiology  Postprocedure Note    Patient: Scott Mi  MRN: [id number]  YOB: 1970  Date of evaluation: 9/21/2023      Procedure Summary     Date: 09/21/23 Room / Location: THE [facility] 08 / [facility] MAIN OR    Anesthesia Start: 0820 Anesthesia Stop: 4657    Procedure: RIGHT OPEN CARPAL TUNNEL RELEASE (Right: Hand) Diagnosis:       Carpal tunnel syndrome on right      (Carpal tunnel syndrome on right [G56.01])    Surgeons: Jesse Enrique MD Responsible Provider: Joe Plunkett MD    Anesthesia Type: general ASA Status: 3          Anesthesia Type: No value filed.     Moon Phase I: Moon Score: 10    Moon Phase II: Moon Score: 10      Anesthesia Post Evaluation    Patient location during evaluation: PACU  Patient participation: complete - patient participated  Level of consciousness: awake and alert  Airway patency: patent  Nausea & Vomiting: no nausea and no vomiting  Complications: no  Cardiovascular status: blood pressure returned to baseline  Respiratory status: acceptable  Hydration status: euvolemic  Pain management: adequate

## 2023-09-21 NOTE — OP NOTE
OPERATIVE NOTE    Patient: Rehan Marti MRN: [id number]  SSN: [id number]    YOB: 1970  Age: 48 y.o. Sex: female      Indications: This is a 48y.o. year-old female who presents with a right carpal tunnel. The patient was admitted for surgery as conservative measures have failed. Date of Procedure: 9/21/2023     Preoperative Diagnosis: Carpal tunnel syndrome on right [G56.01]    Postoperative Diagnosis: * No post-op diagnosis entered *      Procedure: Procedure(s):  RIGHT OPEN CARPAL TUNNEL RELEASE    Surgeon: Mingo Bansal DO and Surgical Assistant: Charli Mendiola PA-C    Assistant: Circulator: Star Moore RN  Relief Circulator: Asad Elena RN  Scrub Person First: Sari Snell  Scrub Person Second: Hans Colby  Physician Assistant: Bobo Canales PA-C      Anesthesia: GETA    Estimated Blood Loss: <10cc    Specimens: * No specimens in log *     Drains: none    Implants: * No implants in log *    Complications: None; patient tolerated the procedure well. Procedure: The patient was greeted by anesthesia and taken to the operative suite, where she underwent general endotracheal anesthesia. The patient was positioned in the supine position on a standard orthopedic table. The right arm was sterilely prepped and draped in a standard fashion. The arm was exsanguinated with an Esmarch bandage, which was utilized as a tourniquet. A 1-inch incision was made over the palmar flexion crease in line with the radial aspect of the fourth ray. This was made with a 15 scalpel. The palmar fascia was transected. Retractors were positioned . The central aspect of the deep transverse carpal ligament was respected utilizing a 15 blade scalpel. A freer elevator was passed into the carpal tunnel above the tendinous and neurological structures, and the distal aspect of the deep transverse carpal ligament was subsequently completely respected with a scalpel to the palmar arch.   The freer

## 2023-09-21 NOTE — PERIOP NOTE
TRANSFER - IN REPORT:    Verbal report received from ORN & CRNA on [facility]  being received from OR  for routine post-op      Report consisted of patient's Situation, Background, Assessment and   Recommendations(SBAR). Information from the following report(s) Nurse Handoff Report was reviewed with the receiving nurse. Opportunity for questions and clarification was provided. Assessment completed upon patient's arrival to unit and care assumed.

## 2023-09-21 NOTE — PERIOP NOTE
Awake and alert patent airway dressing to wrist clean dry and intact. Right hand - fingers mobile and brisk capillary refill.

## 2023-09-21 NOTE — ANESTHESIA PRE PROCEDURE
auscultation  (+) asthma:     (-) COPD and sleep apnea                           Cardiovascular:    (+) hypertension:,     (-) past MI and  CHF    ECG reviewed  Rhythm: regular  Rate: normal           Beta Blocker:  Not on Beta Blocker         Neuro/Psych:   Negative Neuro/Psych ROS     (-) seizures and CVA           GI/Hepatic/Renal:   (+) GERD: well controlled, morbid obesity     (-) liver disease and no renal disease       Endo/Other:    (+) DiabetesType II DM, well controlled, , .                 Abdominal:             Vascular: negative vascular ROS. Other Findings:           Anesthesia Plan      general     ASA 3       Induction: intravenous. MIPS: Prophylactic antiemetics administered. Anesthetic plan and risks discussed with patient. Plan discussed with CRNA.                     Eduin Daugherty MD   9/21/2023

## 2023-09-21 NOTE — INTERVAL H&P NOTE
Update History & Physical    The patient's History and Physical of September 21, chart was reviewed with the patient and I examined the patient. There was no change. The surgical site was confirmed by the patient and me. Plan: The risks, benefits, expected outcome, and alternative to the recommended procedure have been discussed with the patient. Patient understands and wants to proceed with the procedure.      Electronically signed by Milton Oseguera MD on 9/21/2023 at 7:53 AM

## (undated) DEVICE — PACK PROCEDURE SURG EXTREMITY CUST

## (undated) DEVICE — SOLUTION IRRIG 500ML 0.9% SOD CHLO USP POUR PLAS BTL

## (undated) DEVICE — CURITY NON-ADHERENT STRIPS: Brand: CURITY

## (undated) DEVICE — GLOVE SURG SZ 7 L12IN FNGR THK79MIL GRN LTX FREE

## (undated) DEVICE — GLOVE SURG SZ 9 THK91MIL LTX FREE SYN POLYISOPRENE ANTI

## (undated) DEVICE — BANDAGE,ELASTIC,ESMARK,STERILE,4"X9',LF: Brand: MEDLINE

## (undated) DEVICE — GOWN,SIRUS,NONRNF,SETINSLV,2XL,18/CS: Brand: MEDLINE

## (undated) DEVICE — APPLICATOR MEDICATED 26 CC SOLUTION HI LT ORNG CHLORAPREP

## (undated) DEVICE — SUTURE ETHLN SZ 3-0 L18IN NONABSORBABLE BLK PS-2 L19MM 3/8 1669H

## (undated) DEVICE — 3M™ TEGADERM™ TRANSPARENT FILM DRESSING FRAME STYLE, 1626W, 4 IN X 4-3/4 IN (10 CM X 12 CM), 50/CT 4CT/CASE: Brand: 3M™ TEGADERM™

## (undated) DEVICE — SPLINT CAST W3INXL15FT FELT BRTH DBL SIDE QUIK STP

## (undated) DEVICE — GLOVE SURG SZ 65 CRM LTX FREE POLYISOPRENE POLYMER BEAD ANTI

## (undated) DEVICE — GARMENT,MEDLINE,DVT,INT,CALF,MED, GEN2: Brand: MEDLINE

## (undated) DEVICE — BNDG,ELSTC,MATRIX,STRL,3"X5YD,LF,HOOK&LP: Brand: MEDLINE

## (undated) DEVICE — GLOVE SURG SZ 65 L12IN FNGR THK79MIL GRN LTX FREE